# Patient Record
Sex: MALE | Race: WHITE
[De-identification: names, ages, dates, MRNs, and addresses within clinical notes are randomized per-mention and may not be internally consistent; named-entity substitution may affect disease eponyms.]

---

## 2019-04-18 ENCOUNTER — HOSPITAL ENCOUNTER (OUTPATIENT)
Dept: HOSPITAL 84 - D.MRI | Age: 69
Discharge: HOME | End: 2019-04-18
Attending: FAMILY MEDICINE
Payer: MEDICARE

## 2019-04-18 DIAGNOSIS — M23.8X1: Primary | ICD-10-CM

## 2020-04-30 ENCOUNTER — HOSPITAL ENCOUNTER (OUTPATIENT)
Dept: HOSPITAL 84 - D.RAD | Age: 70
Discharge: HOME | End: 2020-04-30
Attending: ORTHOPAEDIC SURGERY
Payer: MEDICARE

## 2020-04-30 DIAGNOSIS — S83.231A: Primary | ICD-10-CM

## 2020-06-05 ENCOUNTER — HOSPITAL ENCOUNTER (OUTPATIENT)
Dept: HOSPITAL 84 - D.HCCECHO | Age: 70
Discharge: HOME | End: 2020-06-05
Attending: INTERNAL MEDICINE
Payer: MEDICARE

## 2020-06-05 DIAGNOSIS — M79.605: ICD-10-CM

## 2020-06-05 DIAGNOSIS — M79.604: ICD-10-CM

## 2020-06-05 DIAGNOSIS — M79.89: ICD-10-CM

## 2020-06-05 DIAGNOSIS — I25.10: Primary | ICD-10-CM

## 2020-07-14 ENCOUNTER — HOSPITAL ENCOUNTER (OUTPATIENT)
Dept: HOSPITAL 84 - D.CATH | Age: 70
Discharge: HOME | End: 2020-07-14
Attending: INTERNAL MEDICINE
Payer: MEDICARE

## 2020-07-14 VITALS — DIASTOLIC BLOOD PRESSURE: 99 MMHG | SYSTOLIC BLOOD PRESSURE: 162 MMHG

## 2020-07-14 VITALS
WEIGHT: 270.77 LBS | WEIGHT: 270.77 LBS | BODY MASS INDEX: 41.04 KG/M2 | HEIGHT: 68 IN | HEIGHT: 68 IN | BODY MASS INDEX: 41.04 KG/M2

## 2020-07-14 DIAGNOSIS — I25.10: ICD-10-CM

## 2020-07-14 DIAGNOSIS — J45.909: ICD-10-CM

## 2020-07-14 DIAGNOSIS — Z45.02: Primary | ICD-10-CM

## 2020-07-14 DIAGNOSIS — E78.5: ICD-10-CM

## 2020-07-14 DIAGNOSIS — I42.9: ICD-10-CM

## 2020-07-14 DIAGNOSIS — I10: ICD-10-CM

## 2020-07-14 DIAGNOSIS — I25.2: ICD-10-CM

## 2020-07-14 LAB
ANION GAP SERPL CALC-SCNC: 10.1 MMOL/L (ref 8–16)
APTT BLD: 28.7 SECONDS (ref 22.8–39.4)
BUN SERPL-MCNC: 14 MG/DL (ref 7–18)
CALCIUM SERPL-MCNC: 9 MG/DL (ref 8.5–10.1)
CHLORIDE SERPL-SCNC: 105 MMOL/L (ref 98–107)
CO2 SERPL-SCNC: 28.2 MMOL/L (ref 21–32)
CREAT SERPL-MCNC: 1.1 MG/DL (ref 0.6–1.3)
ERYTHROCYTE [DISTWIDTH] IN BLOOD BY AUTOMATED COUNT: 14.6 % (ref 11.5–14.5)
GLUCOSE SERPL-MCNC: 91 MG/DL (ref 74–106)
HCT VFR BLD CALC: 44.6 % (ref 42–54)
HGB BLD-MCNC: 14.4 G/DL (ref 13.5–17.5)
INR PPP: 0.94 (ref 0.85–1.17)
MCH RBC QN AUTO: 29.1 PG (ref 26–34)
MCHC RBC AUTO-ENTMCNC: 32.3 G/DL (ref 31–37)
MCV RBC: 90.1 FL (ref 80–100)
OSMOLALITY SERPL CALC.SUM OF ELEC: 278 MOSM/KG (ref 275–300)
PLATELET # BLD: 180 10X3/UL (ref 130–400)
PMV BLD AUTO: 10.6 FL (ref 7.4–10.4)
POTASSIUM SERPL-SCNC: 4.3 MMOL/L (ref 3.5–5.1)
PROTHROMBIN TIME: 12.5 SECONDS (ref 11.6–15)
RBC # BLD AUTO: 4.95 10X6/UL (ref 4.2–6.1)
SODIUM SERPL-SCNC: 139 MMOL/L (ref 136–145)
WBC # BLD AUTO: 7.5 10X3/UL (ref 4.8–10.8)

## 2020-07-14 NOTE — HEMODYNAMI
PATIENT:BETTY CHILDRESS                                   MEDICAL RECORD: [id number]
: 52                                            LOCATION:D.CAT          
ACCT# [id number]                                       ADMISSION DATE: 20
 
 
 Generatedon:202015:12
Patient name: BETTY CHILDRESS Patient #: [id number] Visit #: [id number] SSN: [id number]
[id number] :
1950 Date of study: 2020
Page: Of
Hemodynamic Procedure Report
****************************
Patient Data
Patient Demographics
Procedure consent was obtained
First Name: BETTY          Gender: Male
Last Name: FIOR            : 1950
Middle Initial: G           Age: 70 year(s)
Patient #: [id number]       Race: 
Visit #: [id number]
SSN: [id number]
Accession #:
[id number]
Additional ID: [id number]
Contact details
Address: [address] [place] [address]      Phone: [phone]
[facility]
State: [place]
City: [place]
Zip code: [address]
Past Medical History
Allergies: No known allergies
Admission
Admission Data
Admission Date: 2020   Admission Time: 11:59
Arrival Date: 2020     Arrival Time: 0:00
Admit Source: Other         Insurance Payor: Medicare
[facility] #: [id number]
Height (in.): 68            BSA: 2.32 (m2)
Height (cm.): 172.72        BMI: 41.17 (kg/m2)
Weight (lbs.): 270.75
Weight (kg.): 122.81
Lab Results
Lab Result Date: 2020  Lab Result Time: 0:00
Biochemistry
Name         Units    Result                Min      Max
BUN          mg/dl    14       --(--*-)--   7        18
Creatinine   mg/dl    1.1      --(--*-)--   0.6      1.3
CBC
Name         Units    Result                Min      Max
Hematocrit   %        44.6     --(*---)--   42       54
Hemoglobin   g/dl     14.4     --(*---)--   13.5     17.5
Procedure
Procedure Types
Cath Procedure
Diagnostic Procedure
PPM/ICD
Permanent Pacer Generator Exg.
Sedation Charges
 
Moderate Sedation up to 15 minutes
Procedure Description
Procedure Date
Procedure Date: 2020
Procedure Start Time: 14:47
Procedure End Time: 15:06
Procedure Staff
Name                            Function
Michele Boateng MD              Performing Physician
Jeronimo Ott MD             Assisting physician
Marla Keller RT                Monitor
Marylou Looney RT                    Scrub
Parish Trent RN                  Nurse
Procedure Data
Cath Procedure
Fluoroscopy
Diagnostic fluoroscopy      Total fluoroscopy Time: 0
time: 0 min                 min
Diagnostic fluoroscopy      Total fluoroscopy dose: 0
dose: 0 mGy                 mGy
Estimated blood loss: 10 ml
Procedure Complications
No complications
Procedure Medications
Medication           Administration Route Dosage
Oxygen               etCO2 Nasal cannula  2 l/min
Lidocaine 2%         added to field       20
Ancef (1Gm/50ml NS)  I.V.P.B              1 g
Ancef Irrigation     Topical              1 g
(1gm/500ml NS)
Versed               I.V.                 2 mg
Fentanyl             I.V.                 100 mcg
Versed               I.V.                 1 mg
Fentanyl             I.V.                 50 mcg
Versed               I.V.                 1 mg
Fentanyl             I.V.                 50 mcg
Hemodynamics
Rest
BSA: 2.32 (m2) HGB: 14.4 (g/dl) O2 Consumption: Estimated: 276.67 (ml/min) O2 Co
nsumption
indexed: Estimated:119.25 (ml/min/m) Heart Rate: 79 (bpm)
Snapshots
Pre Cath      Intra         NCS           Post Cath
Vital Signs
Time     Heart  Resp   SPO2 etCO2   NIBP (mmHg)  Rhythm  Pain      Sedation
Rate   (ipm)  (%)  (mmHg)                       Status    Level
(bpm)
14:31:43 81     13     93   0       145/102(127) NSR     (Missing) 10(A)
14:36:49 72     12     95   0       151/98(135)  NSR     (Missing) 10(A)
14:41:07 78     23     94   0       135/91(119)  NSR     (Missing) 10(A)
14:45:17 86     15     92   0       132/113(130) NSR     (Missing) 9(A)
14:50:36 96     14     92   2.9     146/105(135) NSR     (Missing) 9(A)
14:54:46 85     15     94   19.4    144/94(122)  NSR     (Missing) 9(A)
14:59:00 86     12     93   2.2     148/95(135)  NSR     (Missing) 9(A)
15:03:18 93     11     93   10.4    150/109(132) NSR     (Missing) 10(A)
15:05:03 89     10     94   11.2    150/103(135) NSR     (Missing) 10(A)
15:09:31 93     12     97   26.1    155/106(131) NSR     (Missing) 10(A)
Medications
Time     Medication  Route   Dose  Verified Delivered Reason    Notes  Effective
ness
 
by       by
14:30:52 Oxygen      etCO2   2     Michele  Buffie    used for
Nasal   l/min KilohPilip Trent RN   procedure
cannula       MD
14:31:06 Lidocaine   added   20ml  Michele Elmoreian used for
2%          to      vial  [place] Philip Ott MD procedure
field   x 2   MD
14:31:20 Ancef       I.V.P.B 1 g   Michele  Buffie    used for
(1Gm/50ml                 Kilo  Trent RN   procedure
NS)                       MD
14:31:28 Ancef       Topical 1 g   Michele  Buffie    used for
Irrigation                Kilo  Trent RN   procedure
(1gm/500ml                MD
NS)
14:41:59 Versed      I.V.    2 mg  Michele  Buffie    for
Iklo  Bell RN   sedation
MD
14:42:06 Fentanyl    I.V.    100   Michele  Buff[place]    for
[facility]   Kilo  Trent RN   sedation
MD
14:49:08 Versed      I.V.    1 mg  Michele  [place]    for
Kilo  Bell RN   sedation
MD
14:49:13 Fentanyl    I.V.    50    Michele  [place]    for
[facility]   Kilo  Trent RN   sedation
MD
14:56:38 Versed      I.V.    1 mg  Michele  [place]    for
Kilo  Bell RN   sedation
MD
14:56:42 Fentanyl    I.V.    50    Michele  [place]    for
[facility]   Kilo  Trent RN   sedation
MD
Procedure Log
Time     Note
13:58:16 Diagnostic Cath Status : Elective
13:58:58 Time tracking: Regular hours (M-F 7:00 - 5:00)
13:59:02 Plan of Care:Hemodynamics will remain stable., Cardiac rhythm will
remain stable., Comfort level will be maintained., Respiratory function
will remain adequate., Patient/ family verbilizes understanding of
procedure., Procedure tolerated without complication., Recovers from
procedure without complications..
14:06:04 H&P Date Dictated: 2020 Within 30 days and on chart..
14:06:05 Pre-procedure instructions explained to patient.
14:06:06 Pre-op teaching completed and patient verbalized understanding.
14:06:07 Family in waiting room.
14:06:09 Patient NPO since Midnight.
14:08:14 Patient allergic to No known allergies
14:: Arrival Date: 2020 12:00:00 AM
14:: Admit Source: Other
14:: Insurance Payor : Medicare
14:09:03 Patient Height : 68 inches
14::08 Patient Weight : 270.75 lbs
14:37 Lab Result : Hemoglobin 14.4 g/dl
: Lab Result : Hematocrit 44.6 %
: Lab Result : Creatinine 1.1 mg/dl
: Lab Result : BUN 14 mg/dl
14: Informed consent obtained and on chart
14:10: Parish Trent RN sent for patient. Start room use.
14:19: Patient received from Pre/Post Procedure Room to CCL 3 Alert and
oriented. Tansferred to table in Supine position.
 
14:19:10 Warm blankets applied, and lizette hugger turned on for patient comfort.
14:19:10 Correct patient and procedure confirmed by team.
14:19:11 ECG and BP/O2 sat monitors applied to patient.
14:30:34 Vital chart was started
14::52 Oxygen 2 l/min etCO2 Nasal cannula was administered by Parish Trent RN;
used for procedure; Verbal order read back and verified.
14:31:06 Lidocaine 2% 20ml vial x 2 added to field was administered by Jeronimo Ott MD; used for procedure; Verbal order read back and verified.
14:31:20 Ancef (1Gm/50ml NS) 1 g I.V.P.B was administered by Parish Trent RN; use
d
for procedure; Verbal order read back and verified.
14:31:28 Ancef Irrigation (1gm/500ml NS) 1 g Topical was administered by Parish Trent RN; used for procedure; Verbal order read back and verified.
14:32:34 Is the patient allergic to Iodine/contrast media? No.
14:32:37 Was the patient premedicated? N/A
14:32:39 Is patient on blood thinner?No
14:32:40 Patient diabetic? No.
14:32:42 If diabetic: On Metformin? N/A
14:32:43 -----------------------------------------------------------------------
-
14:32:44 ----Pre-sedation anethsthesia assessment.----
14:32:47 Previous problem with sedation/anesthesia? No ?
14:32:48 Snore? Yes
14:32:49 Sleep apnea? No
14:32:50 Deviated septum? No
14:32:51 Opens mouth fully? Yes
14:32:52 Sticks out tongue? Yes
14:32:56 Airway obstruction? Yes asthma
14:32:59 Dentures? No ?
14:33:05 Patient pain scale 0/10 ?.
14:33:13 IV patent on arrival in left antecubital with 0.9% NaCl at [facility].
14:33:16 Lab results completed and on chart.
14:33:21 Stress Test: no; N/A ?
14:33:27 Left chest area was prepped with chlora-prep and draped in sterile
fashion
14:33:28 Alarms reviewed by R. N.
14:33:28 Sharps counted by scrub and verified by R.N.
14:33:33 Use device set SHAR PPM
14:33:37 Cautery Tip  opened to sterile field.
14:33:39 Cautery Pushbutton Pencil opened to sterile field.
14:33:39 Mepilex Dressing (013465) opened to sterile field.
14:33:49 Full Disclosure recording started
14:33:52 Baseline sample Acquired.
14:35:03 Rhythm: sinus rhythm
14:40:43 3-0 Vicryl Single Pack [id number] opened to sterile field.
14:40:44 5-0 Monocryl PS2 Y495G opened to sterile field.
14:41:13 --------ALL STOP TIME OUT------
14:41:13 Final Timeout: patient, procedure, and site verified with staff and
physician. All members of the team are in agreement.
14:41:16 Left chest site verified by team.
14:41:25 Fire Safety Assessment: A--An alcohol-based skin anteseptic being used
preoperatively., C--Open oxygen or nitrous oxide is being used., D--An
ESU, laser, or fiber-optic light is being used.
14:41:31 Physical assessment completed. ASA score P 2 - A patient with mild
systemic disease as per Michele Boateng MD.
14:41:39 Sedation plan: IV Moderate Sedation Medication:Versed, Fentanyl
14:41:59 Versed 2 mg I.V. was administered by Parish Trent RN; for sedation;
Verbal order read back and verified.
14:42:06 Fentanyl 100 mcg I.V. was administered by Parish Trent RN; for sedation;
Verbal order read back and verified.
 
14:44:17 Procedure started.
14:47:06 Med[employer] representative DAVID MURPHY present for procedure.
14:47:16 Grounding pad site Left thigh.
14:47:17 Grounding pad site free from injury.
14:47:18 Lidocaine 1% was administered to left subclavicular area by Jeronimo Ott MD .
14:47:50 Pre sharps counted by scrub and verified by RN: Sutures: 7; Sponges: 5;
Stick needles: 2; Skin needles: 2; Blade: 1; Cautery: 1
14:49:07 2-0 Ticron Multipack ([id number]) opened to sterile field.
14:49:08 Versed 1 mg I.V. was administered by Parish Trent RN; for sedation;
Verbal order read back and verified.
14:49:13 Fentanyl 50 mcg I.V. was administered by Parish Trent RN; for sedation;
Verbal order read back and verified.
14:49:43 Incision made to left subclavicular area.
14:52:27 Generator pocket made/opened.
14:52:49 PPM Dual was removed..
14:53:00 SETVIa MRI PPM Dual Generator [id number] opened to sterile field.
14:54:00 PPM Dual was attached to lead(s) and inserted into pocket.
14:55:56 PPM Dual was inserted subcutaneously to left chest.
14:56:38 Versed 1 mg I.V. was administered by Parish Trent RN; for sedation;
Verbal order read back and verified.
14:56:42 Fentanyl 50 mcg I.V. was administered by Parish Trent RN; for sedation;
Verbal order read back and verified.
14:57:02 Generator was sutured in place with 2-0 ticron.
15:01:23 Device pocket was irrigated with Ancef.
15:01:25 Subcutaneous closure was completed with 3-0 vicryl.
15:01:35 Skin closure was completed with 5-0 monocryl.
15:03:10 Lt Chest incision was dressed with Mepilex dressing.
15:03:32 Post sharps counted by scrub and verified by RN: Sutures: 3; Sponges: 5
;
Stick needles: 2; Skin needles: 2; Blade: 1; Cautery: 1
15:04:02 Procedure ended.(Physican Out)
15:04:19 Fluoroscopy time 00.00 minutes.
15:04:22 Fluoroscopy dose: 0 mGy
15:04: Flurop Dose total: 0
15:04:24 Dose Area Product ? mGy/cm.
15:04:27 Sharps counted by scrub and verified by R.N.
15:04:35 Post-procedure physical assessment completed. ASA score P 2 - A patient
with mild systemic disease as per Michele Boateng MD.
15:04:41 Post procedure rhythm: paced
15:04:44 Estimated blood loss: 10 ml
15:04:46 Post procedure instruction explained to patient.Patient verbalizes
understanding.
15:04:46 Patient needs reinforcement of post procedure teaching.
15:05:20 Procedure type changed to Cath procedure, Diagnostic procedure, PPM/ICD
,
Permanent Pacer Generator Exg., Sedation Charges, Moderate Sedation up
to 15 minutes
15:06:17 Procedure and supply charges have been captured, reviewed, submitted an
d
are correct.
15:06:22 Procedure Complication : No complications
15:06:29 Operative report dictated upon procedure completion.
15:06:29 See physician's report for complete and final results.
15:06:35 Report given to Pre/Post Procedure Room.
15:06:39 Patient transfered to Pre/Post Procedure Room with Stretcher.
15:06:42 Procedure ended.
15:06:42 Full Disclosure recording stopped
15:10:17 Vital chart was stopped
15:10:18 End room use (Document Last)
 
15:10:53 Medtronic 4074-52 PPM Lead opened to sterile field.
Device Usage
Item Name    Manufacture  Quantity  Catalog     Hospital Part    Current Minimal
 Lot# /
Number      Charge   Number  Stock   Stock   Serial#
Code
Cautery Tip  Microtek     1         [id number]    249380   827580  076871  5
      Medical Inc.
Cautery      Microtek     1         [id number]      503809   09190   444700  5
Pushbutton   [employer] Inc.
Pencil
Mepilex      Cardinal     1         579336      826282   072436  622236  5
Dressing     Health
(309293)
3-0 Vicryl   Ethicon      1         [id number]     010650   122383  311932  5
Single Pack
[id number]
5-0 Monocryl Ethicon      1         Y495G       065950   126041  972790  5
PS2 Y495G
2-0 Ticron   Ethicon      1         [id number]  247489   97178   049530  5
Multipack
([id number])
Medtronic    Medtronic    1         A2DR01      855903   906744  679273  5
Advisa MRI                                                                      
 [id number]
PPM Dual                                                                        
 EXP
Generator                                                                       
 :
A2DR01                                                                          
 2021
Medtronic    Medtronic    1         4074-52     819622   106553  437676  5
4074-52 PPM
Lead
Signature Audit [place]
Stage           Time        Signature      Unsigned
Intra-Procedure 2020   Marla Keller
3:10:53 PM  RT(R)
Intra-Procedure 2020   Parish Trent RN
3:12:08 PM
Intra-Procedure 2020   Michele Felipe
3:12:36 PM  Philip WELCH
 
 
 
 
 
 
 
 
 
 
 
 
 
[facility]                                          
[address] [place] [address]

## 2020-07-14 NOTE — NUR
PT RECEIVED BACK TO ROOM VIA STRETCHER FROM CATH LAB.  XRAY AT BS.
PT AWAKE BUT DROWSY, PT PLACED ON CARDIAC MONITORS AND O2 VIA NC AT
2L.  SM MEDIPLEX DRESSING TO U LEFT CHEST, NO BLEEDING OR SWELLING
NOTED.  PT DENIES PAIN OR DISCOMFORT.  VSS.  HR PACED 78, /92,
RR 10.  NO IV, IT WAS PULLED OUT IN THE CATH LAB.  CALL LIGHT IN
REACH, WIFE AT BS.

## 2020-07-14 NOTE — NUR
1620 SANDWICH TRAY AND DRINK SERVED.
PT RESTING COMFORTABLY.  TOLERATED SANDWICH TRAY AND DRINK.  MEDIPLEX
DRESSING CDI NO S/S BLEEDING. VSS.

## 2020-07-14 NOTE — NUR
DISCHARGE INSTRUCTIONS REVIEWED W PT AND WIFE, BOTH VERBALIZED
UNDERSTANDING.  MONITORS REMOVED AND PT UP TO DRESS FOR DISCHARGE
WITH ASSIST FROM WIFE.

## 2020-07-14 NOTE — NUR
PT TO BR VIA WC, VOIDING W/O DIFFICULITY. PT THEN DISCHARGED VIA WC
TO WIFE WAITING IN PRIVATE VEHICLE.  PT HAD ALL BELONGINGS AND
DISCHARGE PAPERWORK

## 2020-07-14 NOTE — OP
PATIENT NAME:  BETTY CHILDRESS                            MEDICAL RECORD: [id number]
:52                                             LOCATION:D.CAT          
                                                         ADMISSION DATE:        
SURGEON:  JERONIMO MYLES MD          
 
 
DATE OF OPERATION:  2020
 
PREOPERATIVE DIAGNOSES:
1.  End-of-life AICD generator.
2.  Coronary artery disease.
3.  Hypertension.
4.  Cardiomyopathy.
5.  Hyperlipidemia.
 
POSTOPERATIVE DIAGNOSES:
1.  End-of-life AICD generator.
2.  Coronary artery disease.
3.  Hypertension.
4.  Cardiomyopathy.
5.  Hyperlipidemia.
 
PROCEDURE:  Left subclavian vein 4 lead AICD generator exchange.
 
SURGEON:  Jeronimo Myles MD
 
REPORT OF PROCEDURE:  The patient's left chest was prepped and draped in sterile
fashion.  A 20 mL of 1% lidocaine with epinephrine was infused into the
surrounding tissues.  The oblique incision in the left superior lateral chest
was reopened and using electrocautery, we dissected down to the AICD generator. 
This generator was eviscerated through the wound and the leads were freed up
from any attachments.  We then took down all 4 leads off the generator and
replaced them appropriately in the new AICD generator.  This was then placed
back into the subcutaneous pouch and sutured down to the pectoral fascia with an
interrupted 2-0 TiCron.  The wound bed was irrigated out with normal saline with
antibiotic solution.  The subcutaneous tissues were reapproximated with
interrupted 3-0 Vicryl and the skin was closed with running subcutaneous 5-0
Monocryl.
 
COMPLICATIONS:  None.
 
CONDITION:  Stable.
 
ANESTHESIA:  Local MAC.
 
BLOOD LOSS:  Minimal.
 
TRANSINT:[id number] Voice Confirmation ID: [id number] DOCUMENT ID: [id number]
                                           
                                           JERONIMO MYLES MD          
CC: SHANON ZELAYA MD and RUPA MURILLO MD               [id number]
DICTATION DATE: 20 1510     :     20      DEP CLI 
                                                                      20
[address] [place] [address]

## 2020-09-01 ENCOUNTER — HOSPITAL ENCOUNTER (OUTPATIENT)
Dept: HOSPITAL 84 - D.LABREF | Age: 70
Discharge: HOME | End: 2020-09-01
Attending: ORTHOPAEDIC SURGERY
Payer: MEDICARE

## 2020-09-01 VITALS — BODY MASS INDEX: 41.1 KG/M2

## 2020-09-01 DIAGNOSIS — M17.11: Primary | ICD-10-CM

## 2020-09-03 ENCOUNTER — HOSPITAL ENCOUNTER (INPATIENT)
Dept: HOSPITAL 84 - D.SDCHOLD | Age: 70
LOS: 33 days | Discharge: NURSING, REHAB OR LONG-TERM CARE | DRG: 469 | End: 2020-10-06
Attending: ORTHOPAEDIC SURGERY | Admitting: ORTHOPAEDIC SURGERY
Payer: MEDICARE

## 2020-09-03 VITALS
HEIGHT: 71 IN | WEIGHT: 276 LBS | BODY MASS INDEX: 38.64 KG/M2 | BODY MASS INDEX: 38.64 KG/M2 | BODY MASS INDEX: 38.64 KG/M2

## 2020-09-03 DIAGNOSIS — F03.90: ICD-10-CM

## 2020-09-03 DIAGNOSIS — I50.21: ICD-10-CM

## 2020-09-03 DIAGNOSIS — Z87.891: ICD-10-CM

## 2020-09-03 DIAGNOSIS — D62: ICD-10-CM

## 2020-09-03 DIAGNOSIS — Z95.0: ICD-10-CM

## 2020-09-03 DIAGNOSIS — M17.11: Primary | ICD-10-CM

## 2020-09-03 DIAGNOSIS — I25.10: ICD-10-CM

## 2020-09-03 DIAGNOSIS — J45.909: ICD-10-CM

## 2020-09-23 LAB
ANION GAP SERPL CALC-SCNC: 8.9 MMOL/L (ref 8–16)
APTT BLD: 27.2 SECONDS (ref 22.8–39.4)
BASOPHILS NFR BLD AUTO: 1 % (ref 0–2)
BILIRUB SERPL-MCNC: NEGATIVE MG/DL
BUN SERPL-MCNC: 17 MG/DL (ref 7–18)
CALCIUM SERPL-MCNC: 9.2 MG/DL (ref 8.5–10.1)
CHLORIDE SERPL-SCNC: 102 MMOL/L (ref 98–107)
CO2 SERPL-SCNC: 28.6 MMOL/L (ref 21–32)
CREAT SERPL-MCNC: 1.2 MG/DL (ref 0.6–1.3)
EOSINOPHIL NFR BLD: 5.2 % (ref 0–7)
ERYTHROCYTE [DISTWIDTH] IN BLOOD BY AUTOMATED COUNT: 14.2 % (ref 11.5–14.5)
GLUCOSE SERPL-MCNC: 89 MG/DL (ref 74–106)
HCT VFR BLD CALC: 45.5 % (ref 42–54)
HGB BLD-MCNC: 14.8 G/DL (ref 13.5–17.5)
IMM GRANULOCYTES NFR BLD: 0.6 % (ref 0–5)
INR PPP: 0.93 (ref 0.85–1.17)
KETONES UR STRIP-MCNC: NEGATIVE MG/DL
LYMPHOCYTES NFR BLD AUTO: 25 % (ref 15–50)
MCH RBC QN AUTO: 29.2 PG (ref 26–34)
MCHC RBC AUTO-ENTMCNC: 32.5 G/DL (ref 31–37)
MCV RBC: 89.9 FL (ref 80–100)
MONOCYTES NFR BLD: 7.8 % (ref 2–11)
NEUTROPHILS NFR BLD AUTO: 60.4 % (ref 40–80)
NITRITE UR-MCNC: NEGATIVE MG/ML
OSMOLALITY SERPL CALC.SUM OF ELEC: 270 MOSM/KG (ref 275–300)
PH UR STRIP: 5 [PH] (ref 5–8)
PLATELET # BLD: 183 10X3/UL (ref 130–400)
PMV BLD AUTO: 10.7 FL (ref 7.4–10.4)
POTASSIUM SERPL-SCNC: 4.5 MMOL/L (ref 3.5–5.1)
PROTHROMBIN TIME: 12.5 SECONDS (ref 11.6–15)
RBC # BLD AUTO: 5.06 10X6/UL (ref 4.2–6.1)
SODIUM SERPL-SCNC: 135 MMOL/L (ref 136–145)
UROBILINOGEN UR-MCNC: NORMAL MG/DL (ref ?–2)
WBC # BLD AUTO: 6.9 10X3/UL (ref 4.8–10.8)

## 2020-09-28 VITALS — DIASTOLIC BLOOD PRESSURE: 70 MMHG | SYSTOLIC BLOOD PRESSURE: 114 MMHG

## 2020-09-28 VITALS — SYSTOLIC BLOOD PRESSURE: 148 MMHG | DIASTOLIC BLOOD PRESSURE: 90 MMHG

## 2020-09-28 VITALS — SYSTOLIC BLOOD PRESSURE: 115 MMHG | DIASTOLIC BLOOD PRESSURE: 71 MMHG

## 2020-09-28 VITALS — SYSTOLIC BLOOD PRESSURE: 128 MMHG | DIASTOLIC BLOOD PRESSURE: 74 MMHG

## 2020-09-28 VITALS — DIASTOLIC BLOOD PRESSURE: 61 MMHG | SYSTOLIC BLOOD PRESSURE: 123 MMHG

## 2020-09-28 VITALS — DIASTOLIC BLOOD PRESSURE: 71 MMHG | SYSTOLIC BLOOD PRESSURE: 115 MMHG

## 2020-09-28 VITALS — DIASTOLIC BLOOD PRESSURE: 76 MMHG | SYSTOLIC BLOOD PRESSURE: 141 MMHG

## 2020-09-28 VITALS — DIASTOLIC BLOOD PRESSURE: 77 MMHG | SYSTOLIC BLOOD PRESSURE: 113 MMHG

## 2020-09-28 VITALS — DIASTOLIC BLOOD PRESSURE: 66 MMHG | SYSTOLIC BLOOD PRESSURE: 97 MMHG

## 2020-09-28 VITALS — SYSTOLIC BLOOD PRESSURE: 102 MMHG | DIASTOLIC BLOOD PRESSURE: 61 MMHG

## 2020-09-28 VITALS — SYSTOLIC BLOOD PRESSURE: 105 MMHG | DIASTOLIC BLOOD PRESSURE: 71 MMHG

## 2020-09-28 VITALS — SYSTOLIC BLOOD PRESSURE: 125 MMHG | DIASTOLIC BLOOD PRESSURE: 86 MMHG

## 2020-09-28 VITALS — SYSTOLIC BLOOD PRESSURE: 114 MMHG | DIASTOLIC BLOOD PRESSURE: 78 MMHG

## 2020-09-28 LAB
ALBUMIN SERPL-MCNC: 3.4 G/DL (ref 3.4–5)
ALP SERPL-CCNC: 57 U/L (ref 30–120)
ALT SERPL-CCNC: 19 U/L (ref 10–68)
ANION GAP SERPL CALC-SCNC: 6.5 MMOL/L (ref 8–16)
BASOPHILS NFR BLD AUTO: 0.3 % (ref 0–2)
BILIRUB SERPL-MCNC: 0.17 MG/DL (ref 0.2–1.3)
BUN SERPL-MCNC: 16 MG/DL (ref 7–18)
CALCIUM SERPL-MCNC: 8.2 MG/DL (ref 8.5–10.1)
CHLORIDE SERPL-SCNC: 103 MMOL/L (ref 98–107)
CO2 SERPL-SCNC: 30.7 MMOL/L (ref 21–32)
CREAT SERPL-MCNC: 1.2 MG/DL (ref 0.6–1.3)
EOSINOPHIL NFR BLD: 0.1 % (ref 0–7)
ERYTHROCYTE [DISTWIDTH] IN BLOOD BY AUTOMATED COUNT: 14.2 % (ref 11.5–14.5)
GLOBULIN SER-MCNC: 3.4 G/L
GLUCOSE SERPL-MCNC: 153 MG/DL (ref 74–106)
HCT VFR BLD CALC: 41.7 % (ref 42–54)
HGB BLD-MCNC: 13.5 G/DL (ref 13.5–17.5)
IMM GRANULOCYTES NFR BLD: 0.4 % (ref 0–5)
LYMPHOCYTES NFR BLD AUTO: 7.2 % (ref 15–50)
MCH RBC QN AUTO: 29.5 PG (ref 26–34)
MCHC RBC AUTO-ENTMCNC: 32.4 G/DL (ref 31–37)
MCV RBC: 91 FL (ref 80–100)
MONOCYTES NFR BLD: 2.8 % (ref 2–11)
NEUTROPHILS NFR BLD AUTO: 89.2 % (ref 40–80)
OSMOLALITY SERPL CALC.SUM OF ELEC: 275 MOSM/KG (ref 275–300)
PLATELET # BLD: 168 10X3/UL (ref 130–400)
PMV BLD AUTO: 10.8 FL (ref 7.4–10.4)
POTASSIUM SERPL-SCNC: 4.2 MMOL/L (ref 3.5–5.1)
PROT SERPL-MCNC: 6.8 G/DL (ref 6.4–8.2)
RBC # BLD AUTO: 4.58 10X6/UL (ref 4.2–6.1)
SODIUM SERPL-SCNC: 136 MMOL/L (ref 136–145)
WBC # BLD AUTO: 10.8 10X3/UL (ref 4.8–10.8)

## 2020-09-28 PROCEDURE — 0SRC0JZ REPLACEMENT OF RIGHT KNEE JOINT WITH SYNTHETIC SUBSTITUTE, OPEN APPROACH: ICD-10-PCS | Performed by: ORTHOPAEDIC SURGERY

## 2020-09-28 NOTE — NUR
BEDSIDE REPORT RECEIVED WITH HODAN OGDEN IN ROOM. PATIENT CALM, ALERT, AND
ORIENTED TO SELF AND SURROUNDINGS. WIFE AT BEDSIDE. PATIENT RESPONDS TO
QUESTIONS APPROPRIATELY WITH SOME GARBLED SPEECH, BUT EASILY COMPREHENDABLE.
PATIENT HAS IVF FLUIDS INFUSING PER ORDER TO PATENT 20 G IV TO THE LEFT HAND.
PATIENT CURRENTLY ON CPM PER ORDER. PERIPHERAL PULSES ASSESSED, PALPABLE
DORSALIS PEDAL PULSE. EXTREMETY WARM, PATIENT WIGGLES TOES UPON COMMAND, WITH
LESS THAN THREE SECOND CAPILLARY REFILL. DENIES PAIN AND DISCOMFORT AT THIS
TIME. CPOC.

## 2020-09-28 NOTE — NUR
THROUGH TRAFFIC KEPT TO A MINIMUM. HIBACLENS AND ALCOHOL USED TO
CLEAN BEFORE PREPPING. STERILE GOWNED AND GLOVED TO PREP WITH
CHLORAPREP.

## 2020-09-28 NOTE — NUR
PT WIFE HAS ARRIVED. PUT BELONGINGS IN ROOM. MADE SURE I HAD THE RIGHT PHONE
NUMBER AND SHE HAS STEPPED OUTSIDE. PT SLEEPING. VS STABLE. CL IN REACH. WCTM

## 2020-09-28 NOTE — NUR
PT ARRIVED TO UNIT. VS STABLE. QUICK START, ADMISSION ASSESSMENT, ADMISSION
HISTORY AND SUICIDE SCREEN COMPLETED. MEDS GIVEN PER EMAR. WIFE NOT AT BEDSIDE
AT THIS TIME. PT SLEEPING AT THIS TIME. EASILY AWAKENED. CL IN REACH. WCTM.

## 2020-09-28 NOTE — NUR
IN AND OUT CATH PERFORMED USING STERILE TECHNIQUE. THIS NURSE CLAMPED IN AND
OUT CATH BAG EVERY 100 ML TO AVOID BLADDER SPASMS. RECEIVED 850 IN YELLOW
URINE. PATIENT EXPRESSED REFLEIF. EDUCATED PATIENT AND WIFE, HERMANN, THAT IN
AND OUT CATH SHOULD STIMULATE ELIMINATION PROCESS BUT COULD STILL TAKE A FEW
HOURS BEFORE URGE RETURNED BUT THIS NURSE WILL MONITOR. URINAL AT BEDSIDE.
ENCOURAGED TO PLACE INBETWEEN PATIENTS LEGS AND SIT HOB UP TO ENCOURAGE URGE.
INSTRUCTED BOTH WIFE AND PATIENT THAT THIS NURSE WILL BE AVAILABLE TO HELP
WITH ANY AND ALL NEEDS. EDUCATED ON USE OF CALL LIGHT. EXPLAINED PLAN OF CARE
FOR SHIFT. BOTH RECEPTIVE TO TEACHING. DENIES QUESTIONS AT THIS TIME. CALL
LIGHT CLOSE. CPOC.

## 2020-09-28 NOTE — NUR
ADMINISTERED HS MEDICATIONS. REMOVED PATIENT FROM CPM. ASSISTED WITH
REPOSITIONG FOR COMFORT.SCANT AMOUNT OF BLOOD DRAINAGE TO ACE WRAP. PATIENT
COMPLAINS OF PAIN, 5/10 TO THE RIGHT KNEE. REQUESTS PAIN MEDICINE.
ADMINSITERED PRN MEDICATION PER ORDER. WIFE AT BEDSIDE. EDUCATED WIFE AND
PATIENT ON TURN, COUGH, DEEP BREATHING. PATIENT COUGHED AND TOOK DEEP BREATHS
FOR THIS NURSE. USED INCENTIVE SPIROMETER 5 TIMES WHILE NURSE WAS IN ROOM.
DENIES OTHER NEEDS AT THIS TIME. SCDS ON, BILATERALLY. BED ALARM ON. CALL
LIGHT CLOSE. CPOC.

## 2020-09-28 NOTE — NUR
BLADDER SCAN PERFORMED ON PATIENT WITH HODAN OGDEN. PATIENT HAS NOT VOIDED
SINCE 1000. RECEIVED RESULTS -810 IN BLADDER. EXPLAINED TO PATIENT AND
WIFE AT BEDSIDE THAT IN AND OUT CATH WOULD NEED TO BE PERFORMED TO STIMULATE
URINATION/PROSTATE. VERBALIZED UNDERSTANDING.

## 2020-09-28 NOTE — NUR
WIFE IN ROOM. NO CO OF PAIN AT THIS TIME. PT LAID BACK IN BED FOR COMFORT. CL
IN REACH. BED ALARM ON. WCTM

## 2020-09-28 NOTE — MORECARE
CASE MANAGEMENT DISCHARGE SUMMARY
 
 
PATIENT: BETTY LIRA                      UNIT: [id number]
ACCOUNT#: [id number]                       ADM DATE: 20
AGE: 70     : [birth year]  SEX: M            ROOM/BED: D.1211    
AUTHOR: LAYLA,DOC                             PHYSICIAN:                               
 
REFERRING PHYSICIAN: CHANCE TERRELL MD        
DATE OF SERVICE: 20
Discharge Plan
 
 
Patient Name: BETTY LIRA
Facility: [facility]:[place]
Encounter #: [id number]
Medical Record #: [id number]
: 1950
Planned Disposition: 
Anticipated Discharge Date: 10/1/20
 
Discharge Date: 
Expected LOS: 3
Initial Reviewer: [id number]
Initial Review Date: 2020
Generated: 20   3:00 pm 
Comments
 
DCP- Discharge Planning
 
Updated by [id number]: Geno Ewing on 20  12:55 pm CT
CM met with patient and wife, Tamika Lira ([phone]) regarding dc plans. 
Patient is drowsy and request that his in interview. Patient is independent 
PTA, with ADL's, medication management. PCP: Dr. Cano. Pharmacy: [facility]. DME: walker, cane. A CPM was delivered to the home, as well PTA. 
CM discussed HHS, OP Therapy, Rehab, SNF. PT has not worked with the patient 
as yet, so CM will re-visit tomorrow in order to determine DC needs. CM left 
a copy of HH agencies and OP Therapy with the patient's wife. CM will follow 
and assist PRN with DC needs.
 DCPIA - Discharge Planning Initial Assessment
 
Updated by [id number]: Geno Ewing on 20   1:58 pm
*  Is the patient Alert and Oriented?
Yes
*  How many steps to enter\exit or inside your home? 3 wide w/r *  PCP Dr. Cano * 
Pharmacy
[facility], [address]
*  Preadmission Environment
Home with Family
*  ADLs
 
Independent
*  Equipment
Cane
Rolling Walker
*  Other Equipment
CPM
*  List name and contact numbers for known caregivers / representatives who 
currently or will assist patient after discharge:
Tamika Lira (wife) [phone]
*  Verbal permission to speak to the caregivers and representatives has been 
obtained from the patient.
Yes
*  Community resources currently utilized
None
*  Additional services required to return to the preadmission environment?
Yes
*  Can the patient safely return to the preadmission environment?
Yes
*  Has this patient been hospitalized within the prior 30 days at any 
hospital?
No
 
 
 
 
 
 
Patient Name: BETTY LIRA
Encounter #: [id number]
Page [phone]
 
 
 
 
 
Electronically Signed by VIVI RICH on 20 at 1401
 
 
 
 
 
 
**All edits/amendments must be made on the electronic document**
 
DICTATION DATE: 20     : POLA  20     
RPT#: [id number]                                DC DATE:        
                                               STATUS: ADM IN  
[facility]
 [place] [address]
***END OF REPORT***

## 2020-09-29 VITALS — SYSTOLIC BLOOD PRESSURE: 141 MMHG | DIASTOLIC BLOOD PRESSURE: 80 MMHG

## 2020-09-29 VITALS — DIASTOLIC BLOOD PRESSURE: 70 MMHG | SYSTOLIC BLOOD PRESSURE: 152 MMHG

## 2020-09-29 VITALS — DIASTOLIC BLOOD PRESSURE: 80 MMHG | SYSTOLIC BLOOD PRESSURE: 150 MMHG

## 2020-09-29 VITALS — SYSTOLIC BLOOD PRESSURE: 124 MMHG | DIASTOLIC BLOOD PRESSURE: 76 MMHG

## 2020-09-29 VITALS — SYSTOLIC BLOOD PRESSURE: 105 MMHG | DIASTOLIC BLOOD PRESSURE: 70 MMHG

## 2020-09-29 VITALS — DIASTOLIC BLOOD PRESSURE: 74 MMHG | SYSTOLIC BLOOD PRESSURE: 126 MMHG

## 2020-09-29 LAB
ALBUMIN SERPL-MCNC: 3.1 G/DL (ref 3.4–5)
ALP SERPL-CCNC: 54 U/L (ref 30–120)
ALT SERPL-CCNC: 16 U/L (ref 10–68)
ANION GAP SERPL CALC-SCNC: 7.5 MMOL/L (ref 8–16)
BASOPHILS NFR BLD AUTO: 0.2 % (ref 0–2)
BILIRUB SERPL-MCNC: 0.42 MG/DL (ref 0.2–1.3)
BUN SERPL-MCNC: 13 MG/DL (ref 7–18)
CALCIUM SERPL-MCNC: 7.9 MG/DL (ref 8.5–10.1)
CHLORIDE SERPL-SCNC: 102 MMOL/L (ref 98–107)
CO2 SERPL-SCNC: 29.6 MMOL/L (ref 21–32)
CREAT SERPL-MCNC: 1.1 MG/DL (ref 0.6–1.3)
EOSINOPHIL NFR BLD: 0.2 % (ref 0–7)
ERYTHROCYTE [DISTWIDTH] IN BLOOD BY AUTOMATED COUNT: 14 % (ref 11.5–14.5)
GLOBULIN SER-MCNC: 3.1 G/L
GLUCOSE SERPL-MCNC: 105 MG/DL (ref 74–106)
HCT VFR BLD CALC: 38.4 % (ref 42–54)
HGB BLD-MCNC: 12.1 G/DL (ref 13.5–17.5)
IMM GRANULOCYTES NFR BLD: 0.3 % (ref 0–5)
LYMPHOCYTES NFR BLD AUTO: 11.1 % (ref 15–50)
MCH RBC QN AUTO: 28.6 PG (ref 26–34)
MCHC RBC AUTO-ENTMCNC: 31.5 G/DL (ref 31–37)
MCV RBC: 90.8 FL (ref 80–100)
MONOCYTES NFR BLD: 10.4 % (ref 2–11)
NEUTROPHILS NFR BLD AUTO: 77.8 % (ref 40–80)
OSMOLALITY SERPL CALC.SUM OF ELEC: 269 MOSM/KG (ref 275–300)
PLATELET # BLD: 171 10X3/UL (ref 130–400)
PMV BLD AUTO: 11.3 FL (ref 7.4–10.4)
POTASSIUM SERPL-SCNC: 4.1 MMOL/L (ref 3.5–5.1)
PROT SERPL-MCNC: 6.2 G/DL (ref 6.4–8.2)
RBC # BLD AUTO: 4.23 10X6/UL (ref 4.2–6.1)
SODIUM SERPL-SCNC: 135 MMOL/L (ref 136–145)
WBC # BLD AUTO: 8.7 10X3/UL (ref 4.8–10.8)

## 2020-09-29 NOTE — NUR
WIFE HERMANN IN ROOM. PT REQUESTED AND WILL RECIEVE COFFEE. WANTS THE CPM
MACHINE OFF HIS LEG. STATED WE WOULD HAVE TO WAIT UNTIL 0815 BEFORE WE CAN DO
THAT. CL IN REACH. BED ALARM ON. WCTM.

## 2020-09-29 NOTE — NUR
CALL FROM NINFA RN MONITOR TECH REPORTS PT HAD 18 BEAT RUN OF V-TACH
REQUESTING CHART TO COMPAIR WITH PREVIOUS EKG, CHART SENT UPSTAIRS, INSTRUCTED
THIS NURSE TO CALL PRIMARY ONCALL WHICH IS ALAN

## 2020-09-29 NOTE — NUR
CALL TO ALAN WILLINGHAM CNP, INFORMED OF REPORTED 18 BEAT RUN OF V-TACH, ORDERES
RECIEVED, EKG DONE AND RESULTS CALLED TO SEE RESTREPO ANP ON CALL FOR
CARDIOLOGY, ORDERS RECIEVED TO TRANSFER TO [address] SUPPER VISOR NOTIFIED
FOR ROOM

## 2020-09-29 NOTE — NUR
ANSWERED PATIENT CALL LIGHT. PATIENT CONFUSED. TOOK OFF TELEMTREY AND
ATTEMPTING TO PULL OUT IV. CALMED PATIENT. WIFE AT BEDSIDE. ASSISTED PATIENT
WITH URINATING. VOIDED 550 CC OF LIGHT YELLOW URINE. PATIENT CALMED AFTER
VOIDING. REAPPLIED TELEMTREY. REAPPLIED SUHA HOSE BILATERALLY. IV REMAINS
PATENT. ASSISTED PATIENT TO COMFORTABLE POSITION. ATTEMPTING TO REST WHEN
EXITING THE ROOM. CALL LIGHT CLOSE. INSTRUCTED WIFE TO LET THIS NURSE KNOW OF
ANY NEEDS. CPOC.

## 2020-09-29 NOTE — NUR
VITALS ASSESSED. PATIENT DENIES PAIN, STATES "MEDS WORKED". REMOVED TEDS
BILATERALLY FOR ONE HOUR BREAK PER POLICY PROTOCAL.

## 2020-09-29 NOTE — NUR
PT INSISTING HE GET UP. PT AWARE IF HE GETS UP HE WILL FALL. REFUSES LUNCH.
WIFE IN ROOM NOW. CHAIR ALARM ON. WCTM

## 2020-09-29 NOTE — NUR
REPORT CALLED TO ROS ON MED 2, PT AND WIFE INFORMEND OF TRANSFER, DR WRIGHT ON CALL FOR ORTHO NOTIFIED OF TRANSFER

## 2020-09-30 VITALS — SYSTOLIC BLOOD PRESSURE: 130 MMHG | DIASTOLIC BLOOD PRESSURE: 72 MMHG

## 2020-09-30 VITALS — DIASTOLIC BLOOD PRESSURE: 84 MMHG | SYSTOLIC BLOOD PRESSURE: 136 MMHG

## 2020-09-30 VITALS — DIASTOLIC BLOOD PRESSURE: 49 MMHG | SYSTOLIC BLOOD PRESSURE: 135 MMHG

## 2020-09-30 VITALS — SYSTOLIC BLOOD PRESSURE: 138 MMHG | DIASTOLIC BLOOD PRESSURE: 83 MMHG

## 2020-09-30 VITALS — DIASTOLIC BLOOD PRESSURE: 83 MMHG | SYSTOLIC BLOOD PRESSURE: 131 MMHG

## 2020-09-30 LAB
ALBUMIN SERPL-MCNC: 3 G/DL (ref 3.4–5)
ALP SERPL-CCNC: 49 U/L (ref 30–120)
ALT SERPL-CCNC: 18 U/L (ref 10–68)
ALT SERPL-CCNC: 18 U/L (ref 10–68)
ANION GAP SERPL CALC-SCNC: 6.5 MMOL/L (ref 8–16)
ANION GAP SERPL CALC-SCNC: 7.3 MMOL/L (ref 8–16)
ANION GAP SERPL CALC-SCNC: 8.4 MMOL/L (ref 8–16)
APTT BLD: 35.1 SECONDS (ref 22.8–39.4)
BASOPHILS NFR BLD AUTO: 0.6 % (ref 0–2)
BILIRUB SERPL-MCNC: 0.55 MG/DL (ref 0.2–1.3)
BUN SERPL-MCNC: 13 MG/DL (ref 7–18)
BUN SERPL-MCNC: 13 MG/DL (ref 7–18)
BUN SERPL-MCNC: 14 MG/DL (ref 7–18)
CALCIUM SERPL-MCNC: 8 MG/DL (ref 8.5–10.1)
CALCIUM SERPL-MCNC: 8.1 MG/DL (ref 8.5–10.1)
CALCIUM SERPL-MCNC: 8.2 MG/DL (ref 8.5–10.1)
CHLORIDE SERPL-SCNC: 100 MMOL/L (ref 98–107)
CHLORIDE SERPL-SCNC: 101 MMOL/L (ref 98–107)
CHLORIDE SERPL-SCNC: 99 MMOL/L (ref 98–107)
CHOLEST/HDLC SERPL: 3 RATIO (ref 2.3–4.9)
CK MB SERPL-MCNC: 1.7 U/L (ref 0–3.6)
CK MB SERPL-MCNC: 2.1 U/L (ref 0–3.6)
CK MB SERPL-MCNC: 2.2 U/L (ref 0–3.6)
CK SERPL-CCNC: 296 UL (ref 21–232)
CK SERPL-CCNC: 303 UL (ref 21–232)
CK SERPL-CCNC: 336 UL (ref 21–232)
CO2 SERPL-SCNC: 27.9 MMOL/L (ref 21–32)
CO2 SERPL-SCNC: 29.6 MMOL/L (ref 21–32)
CO2 SERPL-SCNC: 31.2 MMOL/L (ref 21–32)
CREAT SERPL-MCNC: 1 MG/DL (ref 0.6–1.3)
CREAT SERPL-MCNC: 1 MG/DL (ref 0.6–1.3)
CREAT SERPL-MCNC: 1.1 MG/DL (ref 0.6–1.3)
EOSINOPHIL NFR BLD: 3.6 % (ref 0–7)
ERYTHROCYTE [DISTWIDTH] IN BLOOD BY AUTOMATED COUNT: 14 % (ref 11.5–14.5)
GLOBULIN SER-MCNC: 3.3 G/L
GLUCOSE SERPL-MCNC: 100 MG/DL (ref 74–106)
GLUCOSE SERPL-MCNC: 109 MG/DL (ref 74–106)
GLUCOSE SERPL-MCNC: 117 MG/DL (ref 74–106)
HCT VFR BLD CALC: 36 % (ref 42–54)
HDLC SERPL-MCNC: 56 MG/DL (ref 32–96)
HGB BLD-MCNC: 11.6 G/DL (ref 13.5–17.5)
IMM GRANULOCYTES NFR BLD: 0.2 % (ref 0–5)
INR PPP: 1.1 (ref 0.85–1.17)
LDL-HDL RATIO: 1.7 RATIO (ref 1.5–3.5)
LDLC SERPL-MCNC: 95 MG/DL (ref 0–100)
LYMPHOCYTES NFR BLD AUTO: 14.5 % (ref 15–50)
MAGNESIUM SERPL-MCNC: 1.8 MG/DL (ref 1.8–2.4)
MCH RBC QN AUTO: 28.6 PG (ref 26–34)
MCHC RBC AUTO-ENTMCNC: 32.2 G/DL (ref 31–37)
MCV RBC: 88.9 FL (ref 80–100)
MONOCYTES NFR BLD: 12.9 % (ref 2–11)
NEUTROPHILS NFR BLD AUTO: 68.2 % (ref 40–80)
OSMOLALITY SERPL CALC.SUM OF ELEC: 264 MOSM/KG (ref 275–300)
OSMOLALITY SERPL CALC.SUM OF ELEC: 265 MOSM/KG (ref 275–300)
OSMOLALITY SERPL CALC.SUM OF ELEC: 270 MOSM/KG (ref 275–300)
PLATELET # BLD: 162 10X3/UL (ref 130–400)
PMV BLD AUTO: 11 FL (ref 7.4–10.4)
POTASSIUM SERPL-SCNC: 3.7 MMOL/L (ref 3.5–5.1)
POTASSIUM SERPL-SCNC: 3.9 MMOL/L (ref 3.5–5.1)
POTASSIUM SERPL-SCNC: 4.3 MMOL/L (ref 3.5–5.1)
PROT SERPL-MCNC: 6.3 G/DL (ref 6.4–8.2)
PROTHROMBIN TIME: 14.2 SECONDS (ref 11.6–15)
RBC # BLD AUTO: 4.05 10X6/UL (ref 4.2–6.1)
SODIUM SERPL-SCNC: 131 MMOL/L (ref 136–145)
SODIUM SERPL-SCNC: 133 MMOL/L (ref 136–145)
SODIUM SERPL-SCNC: 135 MMOL/L (ref 136–145)
TRIGL SERPL-MCNC: 72 MG/DL (ref 30–200)
TROPONIN I SERPL-MCNC: < 0.017 NG/ML (ref 0–0.06)
TSH SERPL-ACNC: 1.03 UIU/ML (ref 0.36–3.74)
WBC # BLD AUTO: 8.1 10X3/UL (ref 4.8–10.8)

## 2020-09-30 NOTE — NUR
Just wanted to give you a head up. The CT scan done did show enlargement, but new pulmonary nodules. They want to discuss with you at upcoming appt. Thanks, Karyn RECEIVED BEDSIDE REPORT. ROUNDING COMPLETE. PATIENT IS ALERT AND PLEASANTLY
CONFUSED. RESPIRATIONS ARE EVEN AND UNLABORED. PATIENT USING CPM MACHINE. WIFE
REQUESTED ICE PACK FOR PATIENT KNEE. NEEDS MET. CALL LIGHT WITHIN REACH. WILL
CPOC.

## 2020-09-30 NOTE — OP
PATIENT NAME:  BETTY CHILDRESS                            MEDICAL RECORD: [id number]
:52                                             LOCATION:D.     D.2114
                                                         ADMISSION DATE:20
SURGEON:  CHANCE TERRELL MD        
 
 
DATE OF OPERATION:  2020
 
PREOPERATIVE DIAGNOSIS:  Degenerative arthritis, right knee.
 
POSTOPERATIVE DIAGNOSIS:  Degenerative arthritis, right knee.
 
PROCEDURE:  Right total knee arthroplasty.
 
SURGEON:  Chance Terrell MD
 
FIRST ASSISTANT:  KATARZYNA Crawley
 
ANESTHESIA:  General.
 
INTRAOPERATIVE COMPLICATIONS:  None.
 
SUMMARY OF PATHOLOGIC FINDINGS:  The patient had most severely patellofemoral
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  The patient
also had chondromalacia of the medial compartment.
 
IMPLANTS USED:  Ivan triathlon total knee arthroplasty size 5 tibial
component, size 5 cruciate retaining femoral component, size 33 symmetric
patella 9 mm, and size 5 x 9 tibial bearing insert X3 press fit.
 
OPERATIVE SUMMARY IN DETAIL:  After obtaining the appropriate preoperative
orthopedic surgery consent as well as anesthetic consultation, evaluation and
clearance, the patient was brought to the operating room and placed on the
operating table in supine position.  After adequate general laryngeal mask
airway was administered, tourniquet was placed on the proximal aspect of the
patient's right lower extremity.  Right lower extremity was prepped and draped
in routine sterile fashion.  At this point, the appropriate timeout was taken
and agreed upon by all given the patient identifiers.  The leg was elevated and
exsanguinated, tourniquet inflated to 350 mmHg.  Midline incision was taken down
for paramedian arthrotomy.  The paramedian arthrotomy was performed.  Patella
was everted and the distal femur was exposed.  Soft tissue excision was done in
the usual fashion.  An intramedullary guide hole was created for distal
intramedullary guided cut.  Distal femoral cut was made.  Attention was turned
to the proximal tibia.  With complete exposure of the proximal tibia and further
resection of residual meniscus, intramedullary guide hole was created here as
well.  Proximal tibia was cut and appropriate measurements were taken and then
distal femoral chamfer cuts were done as well.  Having completed distal femoral
chamfer cuts, the appropriate trials were placed corresponding to the above
final mentioned implants.  After final placement and adjustments, the knee was
taken through range of motion and found to be stable in all planes.  Having
completed this, final distal femoral preparations were completed followed by
proximal tibial preparations.  Having completed this, the very arthritic
articular surface of the patella was excised and prepared for press fit
implantation.  The knee cavity was irrigated and pulsatile lavaged to remove all
debris and then components were press fit into place.  Having completed the
press fit, the knee was taken through range of motion and found to be stable in
all planes.  At this point, a gram of vancomycin and a gram of tobramycin were
placed intra-articular.  The paramedian arthrotomy and skin were closed by Jourdan
 
 
 
OPERATIVE REPORT                               [id number]    BETTY CHILDRESS, FA with #2 Ethibond followed by #1 Vicryl, 2-0 Vicryl and skin staples. 
Sterile dressings were applied.  Tourniquet was deflated.  The patient was
awakened and taken to recovery room in stable condition.  All final needle and
sponge counts were correct.
 
TRANSINT:[id number] Voice Confirmation ID: [id number] DOCUMENT ID: [id number]
                                           
                                           NIDHI WELCH, CHANCE ALONSO        
 
 
 
Electronically Signed by CHANCE TERRELL MD on 20 at 1314
 
 
 
 
 
 
 
 
 
 
 
 
 
 
 
 
 
 
 
 
 
 
 
 
 
 
 
 
 
 
 
 
 
 
 
CC:                                                             [id number]
DICTATION DATE: 20     :     20 1230      ADM IN  
                                                                              
[address] [place] [address]

## 2020-09-30 NOTE — NUR
PT ARRIVED TO FLOOR VIA STRECHER WITH WIFE AT BEDSIDE. 20G IV STARTED IN PT
LOWER FA. DRIP AND BOLUS GIVEN BY HODAN BERNABE. PT 83-TELE. PLACED PT ON 2L FOR
91%. PT BEDFAST AND USES URINAL. BED LOW CALL LIGHT WITHIN REACH. WILL
CONTINUE TO MONITOR.

## 2020-10-01 VITALS — DIASTOLIC BLOOD PRESSURE: 59 MMHG | SYSTOLIC BLOOD PRESSURE: 101 MMHG

## 2020-10-01 VITALS — SYSTOLIC BLOOD PRESSURE: 155 MMHG | DIASTOLIC BLOOD PRESSURE: 91 MMHG

## 2020-10-01 VITALS — SYSTOLIC BLOOD PRESSURE: 140 MMHG | DIASTOLIC BLOOD PRESSURE: 82 MMHG

## 2020-10-01 VITALS — DIASTOLIC BLOOD PRESSURE: 78 MMHG | SYSTOLIC BLOOD PRESSURE: 125 MMHG

## 2020-10-01 VITALS — SYSTOLIC BLOOD PRESSURE: 115 MMHG | DIASTOLIC BLOOD PRESSURE: 73 MMHG

## 2020-10-01 LAB
ALBUMIN SERPL-MCNC: 2.8 G/DL (ref 3.4–5)
ALP SERPL-CCNC: 52 U/L (ref 30–120)
ALT SERPL-CCNC: 19 U/L (ref 10–68)
ANION GAP SERPL CALC-SCNC: 7 MMOL/L (ref 8–16)
BASOPHILS NFR BLD AUTO: 0.4 % (ref 0–2)
BILIRUB SERPL-MCNC: 0.75 MG/DL (ref 0.2–1.3)
BUN SERPL-MCNC: 11 MG/DL (ref 7–18)
CALCIUM SERPL-MCNC: 8.5 MG/DL (ref 8.5–10.1)
CHLORIDE SERPL-SCNC: 101 MMOL/L (ref 98–107)
CO2 SERPL-SCNC: 30.8 MMOL/L (ref 21–32)
CREAT SERPL-MCNC: 1.1 MG/DL (ref 0.6–1.3)
EOSINOPHIL NFR BLD: 4.5 % (ref 0–7)
ERYTHROCYTE [DISTWIDTH] IN BLOOD BY AUTOMATED COUNT: 14 % (ref 11.5–14.5)
GLOBULIN SER-MCNC: 3.6 G/L
GLUCOSE SERPL-MCNC: 106 MG/DL (ref 74–106)
HCT VFR BLD CALC: 35.8 % (ref 42–54)
HGB BLD-MCNC: 11.8 G/DL (ref 13.5–17.5)
IMM GRANULOCYTES NFR BLD: 0.7 % (ref 0–5)
LYMPHOCYTES NFR BLD AUTO: 13.3 % (ref 15–50)
MCH RBC QN AUTO: 29.4 PG (ref 26–34)
MCHC RBC AUTO-ENTMCNC: 33 G/DL (ref 31–37)
MCV RBC: 89.1 FL (ref 80–100)
MONOCYTES NFR BLD: 12.7 % (ref 2–11)
NEUTROPHILS NFR BLD AUTO: 68.4 % (ref 40–80)
OSMOLALITY SERPL CALC.SUM OF ELEC: 268 MOSM/KG (ref 275–300)
PLATELET # BLD: 172 10X3/UL (ref 130–400)
PMV BLD AUTO: 10.9 FL (ref 7.4–10.4)
POTASSIUM SERPL-SCNC: 3.8 MMOL/L (ref 3.5–5.1)
PROT SERPL-MCNC: 6.4 G/DL (ref 6.4–8.2)
RBC # BLD AUTO: 4.02 10X6/UL (ref 4.2–6.1)
SODIUM SERPL-SCNC: 135 MMOL/L (ref 136–145)
WBC # BLD AUTO: 8.1 10X3/UL (ref 4.8–10.8)

## 2020-10-01 NOTE — MORECARE
CASE MANAGEMENT DISCHARGE SUMMARY
 
 
PATIENT: BETTY LIRA                      UNIT: [id number]
ACCOUNT#: [id number]                       ADM DATE: 20
AGE: 70     : 52  SEX: M            ROOM/BED: D.2114    
AUTHOR: LAYLA,DOC                             PHYSICIAN:                               
 
REFERRING PHYSICIAN: CHANCE TERRELL MD        
DATE OF SERVICE: 10/01/20
Discharge Plan
 
 
Patient Name: BETTY LIRA
Facility: [facility]:[place]
Encounter #: [id number]
Medical Record #: [id number]
: 1950
Planned Disposition: 
Anticipated Discharge Date: 10/1/20
 
Discharge Date: 
Expected LOS: 3
Initial Reviewer: [id number]
Initial Review Date: 2020
Generated: 10/1/20   2:22 pm 
Comments
 
DCP- Discharge Planning
 
Updated by [email or web address]: Geno Ewing on 20  12:55 pm CT
CM met with patient and wife, Tamika Lira ([phone]) regarding dc plans. 
Patient is drowsy and request that his in interview. Patient is independent 
PTA, with ADL's, medication management. PCP: Dr. Cano. Pharmacy: [facility]. DME: walker, cane. A CPM was delivered to the home, as well PTA. 
CM discussed HHS, OP Therapy, Rehab, SNF. PT has not worked with the patient 
as yet, so CM will re-visit tomorrow in order to determine DC needs. CM left 
a copy of HH agencies and OP Therapy with the patient's wife. CM will follow 
and assist PRN with DC needs.
 DCPIA - Discharge Planning Initial Assessment
 
Updated by [email or web address]: Geno Ewing on 20   1:58 pm
*  Is the patient Alert and Oriented?
Yes
*  How many steps to enter\exit or inside your home? 3 wide w/r *  PCP Dr. Cano * 
Pharmacy
[facility], [address]
*  Preadmission Environment
Home with Family
*  ADLs
 
Independent
*  Equipment
Cane
Rolling Walker
*  Other Equipment
CPM
*  List name and contact numbers for known caregivers / representatives who 
currently or will assist patient after discharge:
Tamika Lira (wife) [phone]
*  Verbal permission to speak to the caregivers and representatives has been 
obtained from the patient.
Yes
*  Community resources currently utilized
None
*  Additional services required to return to the preadmission environment?
Yes
*  Can the patient safely return to the preadmission environment?
Yes
*  Has this patient been hospitalized within the prior 30 days at any 
hospital?
No
 
External Providers
External Provider: [facility]
 
Next Contact Date: 
Service Request Date: 
Service Type: 
Resolution: 
 
Reviewer: 
Comments: 
 
 
 
 
Coverage Notice
 
Reviewer: [id number] - Geno Ewing
 
Notice Issued Date-Time: 2020  14:57
Notice Type: Patient Choice Letter
 
Notice Delivered To: Patient
Relationship to Patient: Self
Representative Name: Tamika Lira
 
Delivery Method: HAND - Hand Delivered
Jennifer Days:
Prior Verbal Notification: 
 
Recipient Understood Notice: Yes
 
Recipient Signature: Yes
Med Rec Note Co-signed by Attending:
 
Coverage Notice Comment:  NP OP Therapy
 
Last DP export: 20   1:01 p
Patient Name: BETTY LIRA
Encounter #: [id number]
Page [phone]
 
 
 
 
 
Electronically Signed by VIVI RICH on 10/01/20 at 1323
 
 
 
 
 
 
**All edits/amendments must be made on the electronic document**
 
DICTATION DATE: 10/01/20 1322     : POLA  10/01/20 1322     
RPT#: [id number]                                DC DATE:        
                                               STATUS: ADM IN  
[facility]
[address] [place] [address]
***END OF REPORT***

## 2020-10-01 NOTE — MORECARE
CASE MANAGEMENT DISCHARGE SUMMARY
 
 
PATIENT: BETTY LIRA                      UNIT: [id number]
ACCOUNT#: [id number]                       ADM DATE: 20
AGE: 70     : 52  SEX: M            ROOM/BED: D.2114    
AUTHOR: LAYLA,DOC                             PHYSICIAN:                               
 
REFERRING PHYSICIAN: CHANCE TERRELL MD        
DATE OF SERVICE: 10/01/20
Discharge Plan
 
 
Patient Name: BETTY LIRA
Facility: [facility]:[place]
Encounter #: [id number]
Medical Record #: [id number]
: 1950
Planned Disposition: 
Anticipated Discharge Date: 10/1/20
 
Discharge Date: 
Expected LOS: 3
Initial Reviewer: [email or web address]
Initial Review Date: 2020
Generated: 10/1/20   2:31 pm 
Comments
 
DCP- Discharge Planning
 
Updated by [email or web address]: Doreen Sales on 10/1/20  12:24 pm CT
CM noted PT notes, recommending rehab. I spoke with patient, he is confused 
and unable to choose a rehab facility. I spoke with patient's wife on the 
phone and explained inpatient rehab vs SNF. She would like a referral to [facility]. I spoke with Ana Rosa with [facility] and referral faxed. CM will 
continue to follow and assist with discharge planning/needs.
DCP- Discharge Planning
 
Updated by [email or web address]: Geno Ewing on 20  12:55 pm CT
CM met with patient and wife, Tamika Lira ([phone]) regarding dc plans. 
Patient is drowsy and request that his in interview. Patient is independent 
PTA, with ADL's, medication management. PCP: Dr. Cano. Pharmacy: [facility], 
[address] DME: walker, cane. A CPM was delivered to the home, as well PTA. 
CM discussed HHS, OP Therapy, Rehab, SNF. PT has not worked with the patient 
as yet, so CM will re-visit tomorrow in order to determine DC needs. CM left 
a copy of HH agencies and OP Therapy with the patient's wife. CM will follow 
and assist PRN with DC needs.
 DCPIA - Discharge Planning Initial Assessment
 
Updated by [email or web address]: Geno Ewing on 20   1:58 pm
 
*  Is the patient Alert and Oriented?
Yes
*  How many steps to enter\exit or inside your home? 3 wide w/r *  PCP Dr. Cano * 
Pharmacy
[facility]
*  Preadmission Environment
Home with Family
*  ADLs
Independent
*  Equipment
Cane
Rolling Walker
*  Other Equipment
CPM
*  List name and contact numbers for known caregivers / representatives who 
currently or will assist patient after discharge:
Tamika Lira (wife) [phone]
*  Verbal permission to speak to the caregivers and representatives has been 
obtained from the patient.
Yes
*  Community resources currently utilized
None
*  Additional services required to return to the preadmission environment?
Yes
*  Can the patient safely return to the preadmission environment?
Yes
*  Has this patient been hospitalized within the prior 30 days at any 
hospital?
No
 
 
 
 
 
Coverage Notice
 
Reviewer: [email or web address] Mary Ewing
 
Notice Issued Date-Time: 2020  14:57
Notice Type: Patient Choice Letter
 
Notice Delivered To: Patient
Relationship to Patient: Self
Representative Name: Tamika Lira
 
Delivery Method: HAND - Hand Delivered
Jennifer Days:
Prior Verbal Notification: 
 
Recipient Understood Notice: Yes
Recipient Signature: Yes
Med Rec Note Co-signed by Attending:
 
 
Coverage Notice Comment:  NP OP Therapy
Reviewer: [email or web address] Mary Sales
 
Notice Issued Date-Time: 10/01/2020  13:25
Notice Type: Patient Choice Letter
 
Notice Delivered To: Family Member
Relationship to Patient: Spouse
Representative Name: Tamika Lira
 
Delivery Method: PHONE - Phone
Jennifer Days:
Prior Verbal Notification: 
 
Recipient Understood Notice: Yes
Recipient Signature: 
Med Rec Note Co-signed by Attending:
 
Coverage Notice Comment:  [facility]
 
Last DP export: 10/1/20  12:23 p
Patient Name: BETTY LIRA
Encounter #: [id number]
Page [phone]
 
 
 
 
 
Electronically Signed by VIVI RICH on 10/01/20 at 1332
 
 
 
 
 
 
**All edits/amendments must be made on the electronic document**
 
DICTATION DATE: 10/01/20 1331     : DM  10/01/20 1331     
RPT#: [id number]                                DC DATE:        
                                               STATUS: ADM IN  
[facility]
[address] [place] [address]
***END OF REPORT***

## 2020-10-01 NOTE — NUR
AIR SLOWLY RELEASED FROM BAND TO RIGHT WRIST AND TAKEN OFF WITH NO BLEEDING
NOTED AT 17:10, BANDAID APPLIED.

## 2020-10-01 NOTE — NUR
PATIENT DISCHARGED FROM DR WORKMAN CARE WITH ORDERS OF PLAVIX 75 MG FOR 1 MONTH
WITH 5 REFILLS, LASIX 40 MG DAILY, CORDORONE 200 MG BID AND RETURN TO SEE DR WORKMAN IN 4 WEEKS IN THE CLINIC. CARE TURNED OVER TO DR TERRELL.

## 2020-10-02 VITALS — DIASTOLIC BLOOD PRESSURE: 69 MMHG | SYSTOLIC BLOOD PRESSURE: 119 MMHG

## 2020-10-02 VITALS — SYSTOLIC BLOOD PRESSURE: 118 MMHG | DIASTOLIC BLOOD PRESSURE: 58 MMHG

## 2020-10-02 VITALS — DIASTOLIC BLOOD PRESSURE: 73 MMHG | SYSTOLIC BLOOD PRESSURE: 132 MMHG

## 2020-10-02 VITALS — DIASTOLIC BLOOD PRESSURE: 58 MMHG | SYSTOLIC BLOOD PRESSURE: 116 MMHG

## 2020-10-02 LAB
ALBUMIN SERPL-MCNC: 2.7 G/DL (ref 3.4–5)
ALP SERPL-CCNC: 62 U/L (ref 30–120)
ALT SERPL-CCNC: 26 U/L (ref 10–68)
ANION GAP SERPL CALC-SCNC: 10.3 MMOL/L (ref 8–16)
BASOPHILS NFR BLD AUTO: 0.3 % (ref 0–2)
BILIRUB SERPL-MCNC: 0.87 MG/DL (ref 0.2–1.3)
BUN SERPL-MCNC: 15 MG/DL (ref 7–18)
CALCIUM SERPL-MCNC: 8.3 MG/DL (ref 8.5–10.1)
CHLORIDE SERPL-SCNC: 101 MMOL/L (ref 98–107)
CO2 SERPL-SCNC: 29.4 MMOL/L (ref 21–32)
CREAT SERPL-MCNC: 1.1 MG/DL (ref 0.6–1.3)
EOSINOPHIL NFR BLD: 4.9 % (ref 0–7)
ERYTHROCYTE [DISTWIDTH] IN BLOOD BY AUTOMATED COUNT: 14 % (ref 11.5–14.5)
GLOBULIN SER-MCNC: 3.2 G/L
GLUCOSE SERPL-MCNC: 104 MG/DL (ref 74–106)
HCT VFR BLD CALC: 36 % (ref 42–54)
HGB BLD-MCNC: 11.7 G/DL (ref 13.5–17.5)
IMM GRANULOCYTES NFR BLD: 0.8 % (ref 0–5)
LYMPHOCYTES NFR BLD AUTO: 12.6 % (ref 15–50)
MCH RBC QN AUTO: 29 PG (ref 26–34)
MCHC RBC AUTO-ENTMCNC: 32.5 G/DL (ref 31–37)
MCV RBC: 89.1 FL (ref 80–100)
MONOCYTES NFR BLD: 12.2 % (ref 2–11)
NEUTROPHILS NFR BLD AUTO: 69.2 % (ref 40–80)
OSMOLALITY SERPL CALC.SUM OF ELEC: 274 MOSM/KG (ref 275–300)
PLATELET # BLD: 196 10X3/UL (ref 130–400)
PMV BLD AUTO: 11 FL (ref 7.4–10.4)
POTASSIUM SERPL-SCNC: 3.7 MMOL/L (ref 3.5–5.1)
PROT SERPL-MCNC: 5.9 G/DL (ref 6.4–8.2)
RBC # BLD AUTO: 4.04 10X6/UL (ref 4.2–6.1)
SODIUM SERPL-SCNC: 137 MMOL/L (ref 136–145)
WBC # BLD AUTO: 8.7 10X3/UL (ref 4.8–10.8)

## 2020-10-02 NOTE — NUR
RIGHT KNEE DRESSING CHANGE. INCISION CLEAN AND DRY. NO DRAINAGE VISIBLE.
AQUACEL AG DRESSING PLACED OVER INCISION.

## 2020-10-02 NOTE — NUR
Nutrition Follow-up:
Eating well. Ate 100% of breakfast this AM.
Diet: Regular
PO intake: 97% avg x 8 meals
Wt: 276# (9/29)
Labs noted: Ca 8.3, Alb 2.7
Meds noted: Lasix, Protonix, electrolyte protocol
-RD following.

## 2020-10-02 NOTE — OP
PATIENT NAME:  BETTY CHILDRESS                            MEDICAL RECORD: [id number]
:52                                             LOCATION:D.M2     D.2114
                                                         ADMISSION DATE:20
SURGEON:  RUPA MURILLO MD          
 
 
DATE OF OPERATION:  10/01/2020
 
PROCEDURE:  Left heart catheterization, selective coronary angiography, right
radial approach.
 
CATHETERS:  Radial sheath, Tiger catheter.
 
The procedure was well tolerated.  The patient returned to gordillo.  Sheath
removed.  TR band was placed.
 
FINDINGS:  Left ventriculography in 30-degree MELCHOR view; globally hypokinetic
with reduced EF, estimated at 20%.
 
CORONARY ANATOMY:
LEFT MAIN:  Left main is free of disease.
LAD:  Has luminal irregularities with no flow obstructive stenosis.
CIRCUMFLEX:  Totally occluded, fills via right to left collaterals.
RIGHT CORONARY ARTERY:  Right coronary artery has severe complex 80% stenosis in
its mid portion.  This does give rise to circumflex collaterals as well.
 
PLAN:  Intervention momentarily.
 
DESCRIPTION OF PROCEDURE:  Using indwelling radial sheath, a hockey-stick
guiding catheter provided fair guiding catheter support followed by a 300 cm BMW
was placed across the tightly occluded 80% right down this portion of vessel. 
Stent deployed was a 4.0 x 18 mm Integrity nondrug-eluting stent up to 14
atmospheres for 45 seconds.  Final angiography shows excellent resolution
complex 80% stenosis, no significant residual.  This vessel supplies the
circumflex as well, hopefully, this will have improved LV function in the
interim given mild volume overload we will start loop diuretic as well as
Dobutrex for inotropic support. 
 
TRANSINT:[id number] Voice Confirmation ID: [id number] DOCUMENT ID: [id number]
                                           
                                           RUPA MURILLO MD          
 
 
 
Electronically Signed by RUPA MURILLO on 10/02/20 at 0908
 
 
 
 
 
CC:                                                             [id number]
DICTATION DATE: 10/01/20 1128     :     10/01/20 1913      ADM IN  
                                                                              
[facility]                                          
[address] [place] [address]

## 2020-10-02 NOTE — NUR
PT REFUSED TO WEAR CPM. NURSES X 3 EDUCATED PT ON IMPORTANCE OF DEVICE PT
STATED "IT HURT TOO BAD NO." WILL NOTIFY MD.

## 2020-10-03 VITALS — SYSTOLIC BLOOD PRESSURE: 130 MMHG | DIASTOLIC BLOOD PRESSURE: 63 MMHG

## 2020-10-03 VITALS — SYSTOLIC BLOOD PRESSURE: 115 MMHG | DIASTOLIC BLOOD PRESSURE: 67 MMHG

## 2020-10-03 VITALS — DIASTOLIC BLOOD PRESSURE: 70 MMHG | SYSTOLIC BLOOD PRESSURE: 120 MMHG

## 2020-10-03 VITALS — DIASTOLIC BLOOD PRESSURE: 67 MMHG | SYSTOLIC BLOOD PRESSURE: 112 MMHG

## 2020-10-03 LAB
ALBUMIN SERPL-MCNC: 2.7 G/DL (ref 3.4–5)
ALP SERPL-CCNC: 68 U/L (ref 30–120)
ALT SERPL-CCNC: 29 U/L (ref 10–68)
ANION GAP SERPL CALC-SCNC: 10.8 MMOL/L (ref 8–16)
BASOPHILS NFR BLD AUTO: 0.3 % (ref 0–2)
BILIRUB SERPL-MCNC: 1 MG/DL (ref 0.2–1.3)
BUN SERPL-MCNC: 22 MG/DL (ref 7–18)
CALCIUM SERPL-MCNC: 8.4 MG/DL (ref 8.5–10.1)
CHLORIDE SERPL-SCNC: 100 MMOL/L (ref 98–107)
CO2 SERPL-SCNC: 29.6 MMOL/L (ref 21–32)
CREAT SERPL-MCNC: 1.4 MG/DL (ref 0.6–1.3)
EOSINOPHIL NFR BLD: 1.8 % (ref 0–7)
ERYTHROCYTE [DISTWIDTH] IN BLOOD BY AUTOMATED COUNT: 14 % (ref 11.5–14.5)
GLOBULIN SER-MCNC: 4 G/L
GLUCOSE SERPL-MCNC: 120 MG/DL (ref 74–106)
HCT VFR BLD CALC: 34.4 % (ref 42–54)
HGB BLD-MCNC: 11.3 G/DL (ref 13.5–17.5)
IMM GRANULOCYTES NFR BLD: 0.9 % (ref 0–5)
LYMPHOCYTES NFR BLD AUTO: 10.5 % (ref 15–50)
MCH RBC QN AUTO: 29 PG (ref 26–34)
MCHC RBC AUTO-ENTMCNC: 32.8 G/DL (ref 31–37)
MCV RBC: 88.2 FL (ref 80–100)
MONOCYTES NFR BLD: 13.7 % (ref 2–11)
NEUTROPHILS NFR BLD AUTO: 72.8 % (ref 40–80)
OSMOLALITY SERPL CALC.SUM OF ELEC: 277 MOSM/KG (ref 275–300)
PLATELET # BLD: 223 10X3/UL (ref 130–400)
PMV BLD AUTO: 10.7 FL (ref 7.4–10.4)
POTASSIUM SERPL-SCNC: 3.4 MMOL/L (ref 3.5–5.1)
PROT SERPL-MCNC: 6.7 G/DL (ref 6.4–8.2)
RBC # BLD AUTO: 3.9 10X6/UL (ref 4.2–6.1)
SODIUM SERPL-SCNC: 137 MMOL/L (ref 136–145)
WBC # BLD AUTO: 9.9 10X3/UL (ref 4.8–10.8)

## 2020-10-04 VITALS — SYSTOLIC BLOOD PRESSURE: 115 MMHG | DIASTOLIC BLOOD PRESSURE: 57 MMHG

## 2020-10-04 VITALS — SYSTOLIC BLOOD PRESSURE: 147 MMHG | DIASTOLIC BLOOD PRESSURE: 75 MMHG

## 2020-10-04 VITALS — SYSTOLIC BLOOD PRESSURE: 117 MMHG | DIASTOLIC BLOOD PRESSURE: 69 MMHG

## 2020-10-04 VITALS — SYSTOLIC BLOOD PRESSURE: 100 MMHG | DIASTOLIC BLOOD PRESSURE: 48 MMHG

## 2020-10-04 VITALS — DIASTOLIC BLOOD PRESSURE: 85 MMHG | SYSTOLIC BLOOD PRESSURE: 140 MMHG

## 2020-10-04 LAB
ALBUMIN SERPL-MCNC: 2.8 G/DL (ref 3.4–5)
ALP SERPL-CCNC: 69 U/L (ref 30–120)
ALT SERPL-CCNC: 36 U/L (ref 10–68)
ANION GAP SERPL CALC-SCNC: 12.2 MMOL/L (ref 8–16)
BASOPHILS NFR BLD AUTO: 0.4 % (ref 0–2)
BILIRUB SERPL-MCNC: 1.09 MG/DL (ref 0.2–1.3)
BUN SERPL-MCNC: 21 MG/DL (ref 7–18)
CALCIUM SERPL-MCNC: 8.7 MG/DL (ref 8.5–10.1)
CHLORIDE SERPL-SCNC: 99 MMOL/L (ref 98–107)
CO2 SERPL-SCNC: 29.1 MMOL/L (ref 21–32)
CREAT SERPL-MCNC: 1.2 MG/DL (ref 0.6–1.3)
EOSINOPHIL NFR BLD: 7.5 % (ref 0–7)
ERYTHROCYTE [DISTWIDTH] IN BLOOD BY AUTOMATED COUNT: 13.7 % (ref 11.5–14.5)
GLOBULIN SER-MCNC: 4.2 G/L
GLUCOSE SERPL-MCNC: 97 MG/DL (ref 74–106)
HCT VFR BLD CALC: 37 % (ref 42–54)
HGB BLD-MCNC: 12.2 G/DL (ref 13.5–17.5)
IMM GRANULOCYTES NFR BLD: 1.8 % (ref 0–5)
LYMPHOCYTES NFR BLD AUTO: 13.6 % (ref 15–50)
MCH RBC QN AUTO: 29.4 PG (ref 26–34)
MCHC RBC AUTO-ENTMCNC: 33 G/DL (ref 31–37)
MCV RBC: 89.2 FL (ref 80–100)
MONOCYTES NFR BLD: 13.8 % (ref 2–11)
NEUTROPHILS NFR BLD AUTO: 62.9 % (ref 40–80)
OSMOLALITY SERPL CALC.SUM OF ELEC: 276 MOSM/KG (ref 275–300)
PLATELET # BLD: 226 10X3/UL (ref 130–400)
PMV BLD AUTO: 10.5 FL (ref 7.4–10.4)
POTASSIUM SERPL-SCNC: 3.3 MMOL/L (ref 3.5–5.1)
PROT SERPL-MCNC: 7 G/DL (ref 6.4–8.2)
RBC # BLD AUTO: 4.15 10X6/UL (ref 4.2–6.1)
SODIUM SERPL-SCNC: 137 MMOL/L (ref 136–145)
WBC # BLD AUTO: 9.1 10X3/UL (ref 4.8–10.8)

## 2020-10-05 VITALS — DIASTOLIC BLOOD PRESSURE: 66 MMHG | SYSTOLIC BLOOD PRESSURE: 130 MMHG

## 2020-10-05 VITALS — DIASTOLIC BLOOD PRESSURE: 74 MMHG | SYSTOLIC BLOOD PRESSURE: 114 MMHG

## 2020-10-05 VITALS — SYSTOLIC BLOOD PRESSURE: 117 MMHG | DIASTOLIC BLOOD PRESSURE: 67 MMHG

## 2020-10-05 VITALS — SYSTOLIC BLOOD PRESSURE: 124 MMHG | DIASTOLIC BLOOD PRESSURE: 72 MMHG

## 2020-10-05 LAB
ALBUMIN SERPL-MCNC: 2.8 G/DL (ref 3.4–5)
ALP SERPL-CCNC: 79 U/L (ref 30–120)
ALT SERPL-CCNC: 53 U/L (ref 10–68)
ANION GAP SERPL CALC-SCNC: 8.7 MMOL/L (ref 8–16)
BASOPHILS NFR BLD AUTO: 0.6 % (ref 0–2)
BILIRUB SERPL-MCNC: 0.85 MG/DL (ref 0.2–1.3)
BUN SERPL-MCNC: 24 MG/DL (ref 7–18)
CALCIUM SERPL-MCNC: 9 MG/DL (ref 8.5–10.1)
CHLORIDE SERPL-SCNC: 98 MMOL/L (ref 98–107)
CO2 SERPL-SCNC: 31.8 MMOL/L (ref 21–32)
CREAT SERPL-MCNC: 1.3 MG/DL (ref 0.6–1.3)
EOSINOPHIL NFR BLD: 9.3 % (ref 0–7)
ERYTHROCYTE [DISTWIDTH] IN BLOOD BY AUTOMATED COUNT: 13.4 % (ref 11.5–14.5)
GLOBULIN SER-MCNC: 4.2 G/L
GLUCOSE SERPL-MCNC: 102 MG/DL (ref 74–106)
HCT VFR BLD CALC: 36.6 % (ref 42–54)
HGB BLD-MCNC: 11.9 G/DL (ref 13.5–17.5)
IMM GRANULOCYTES NFR BLD: 1.9 % (ref 0–5)
LYMPHOCYTES NFR BLD AUTO: 13.7 % (ref 15–50)
MCH RBC QN AUTO: 29 PG (ref 26–34)
MCHC RBC AUTO-ENTMCNC: 32.5 G/DL (ref 31–37)
MCV RBC: 89.3 FL (ref 80–100)
MONOCYTES NFR BLD: 12.4 % (ref 2–11)
NEUTROPHILS NFR BLD AUTO: 62.1 % (ref 40–80)
NT-PROBNP SERPL-MCNC: 565 PG/ML (ref 0–125)
OSMOLALITY SERPL CALC.SUM OF ELEC: 273 MOSM/KG (ref 275–300)
PLATELET # BLD: 263 10X3/UL (ref 130–400)
PMV BLD AUTO: 10.2 FL (ref 7.4–10.4)
POTASSIUM SERPL-SCNC: 3.5 MMOL/L (ref 3.5–5.1)
PROT SERPL-MCNC: 7 G/DL (ref 6.4–8.2)
RBC # BLD AUTO: 4.1 10X6/UL (ref 4.2–6.1)
SODIUM SERPL-SCNC: 135 MMOL/L (ref 136–145)
WBC # BLD AUTO: 9 10X3/UL (ref 4.8–10.8)

## 2020-10-05 NOTE — NUR
OT NOTE: PT LIEING IN BED..INCREASED VERBALIZATION TODAY, HOWEVER, ALSO
INCREASED CONFUSION/DISORIENTATION. WIFE AT BEDSIDE..STEPPED OUT FOR TMT.  PT
REQUIRED MOD ASSIST X 2 FOR SUPINE TO SIT; SITTING BALANCE GOOD ONCE BOTH FEET
WERE ON FLOOR. PT A AND O X PERSON ONLY.  EVEN WITH SEVERAL CUES, HE WAS
UNABLE TO ORIENT TO PLACE.  PRACTICED NUMEROUS TIMES WHAT TO DO FOR ANY
NEEDS.. FINALLY WITH SEVERAL CUES AND CALL LIGHT IN HAND, HE KNEW TO PUSH THE
TOP BUTTON. ASSISTED PT WITH SIT TO STAND WITH MOD ASSIST..TRANSFERRED FROM
BED TO CHAIR WITH MOD ASSIST X 2. PROVIDED PT WITH WASH CLOTH FOR FACE AND
HANDS. MAX ASSIST TO CLEAN BACK AND LES. MAX ASSIST WITH LE DRESSING. AGAIN
CONTINUED EDUCATION REGARDING
USE OF CALL LIGHT. PT CONT TO HAVE DIFFICULTY DUE TO CONFUSION.
ABDI FERNANDEZ, OTR/L
[phone]

## 2020-10-05 NOTE — NUR
RECEIVED BEDSIDE REPORT. ROUNDING COMPLETE. PATIENT IS ALERT AND PLEASANTLY
CONFUSED. RESSPIRATIONS ARE EVEN AND UNLABORED. NO S/S OF DISTRESS. NO C/O
PAIN. CALL LIGHT WITHIN REACH. WILL CPOC.

## 2020-10-05 NOTE — MORECARE
CASE MANAGEMENT DISCHARGE SUMMARY
 
 
PATIENT: BETTY LIRA                      UNIT: [id number]
ACCOUNT#: [id number]                       ADM DATE: 20
AGE: 70     : 52  SEX: M            ROOM/BED: D.2114    
AUTHOR: LAYLA,DOC                             PHYSICIAN:                               
 
REFERRING PHYSICIAN: CHANCE TERRELL MD        
DATE OF SERVICE: 10/05/20
Discharge Plan
 
 
Patient Name: BETTY LIRA
Facility: [facility]:[place]
Encounter #: [id number]
Medical Record #: [id number]
: 1950
Planned Disposition: Skilled Nursing Facility
Anticipated Discharge Date: 10/1/20
 
Discharge Date: 
Expected LOS: 3
Initial Reviewer: [id number]
Initial Review Date: 2020
Generated: 10/5/20  10:38 am 
Comments
 
DCP- Discharge Planning
 
Updated by [id number]: Geno Ewing on 10/5/20   8:28 am CT
CM contacted [facility], who states she will have a male bed available 
10/6.
DCP- Discharge Planning
 
Updated by [id number]: Doreen Sales on 10/1/20  12:24 pm CT
CM noted PT notes, recommending rehab. I spoke with patient, he is confused 
and unable to choose a rehab facility. I spoke with patient's wife on the 
phone and explained inpatient rehab vs SNF. She would like a referral to [facility]. I spoke with Ana Rosa with [facility] and referral faxed. CM will 
continue to follow and assist with discharge planning/needs.
DCP- Discharge Planning
 
Updated by [email or web address]: Geno Ewing on 20  12:55 pm CT
CM met with patient and wife, Tamika Lira ([phone]) regarding dc plans. 
Patient is drowsy and request that his in interview. Patient is independent 
PTA, with ADL's, medication management. PCP: Dr. Cano. Pharmacy: [facility], 
[address] DME: walker, cane. A CPM was delivered to the home, as well PTA. 
CM discussed HHS, OP Therapy, Rehab, SNF. PT has not worked with the patient 
as yet, so CM will re-visit tomorrow in order to determine DC needs. CM left 
 
a copy of HH agencies and OP Therapy with the patient's wife. CM will follow 
and assist PRN with DC needs.
 DCPIA - Discharge Planning Initial Assessment
 
Updated by [email or web address]: Geno Ewing on 20   1:58 pm
*  Is the patient Alert and Oriented?
Yes
*  How many steps to enter\exit or inside your home? 3 wide w/r *  PCP Dr. Cano * 
Pharmacy
[facility]
*  Preadmission Environment
Home with Family
*  ADLs
Independent
*  Equipment
Cane
Rolling Walker
*  Other Equipment
CPM
*  List name and contact numbers for known caregivers / representatives who 
currently or will assist patient after discharge:
Tamika Lira (wife) [phone]
*  Verbal permission to speak to the caregivers and representatives has been 
obtained from the patient.
Yes
*  Community resources currently utilized
None
*  Additional services required to return to the preadmission environment?
Yes
*  Can the patient safely return to the preadmission environment?
Yes
*  Has this patient been hospitalized within the prior 30 days at any 
hospital?
No
 
 
 
 
 
Coverage Notice
 
Reviewer: [id number] - Genokatie Ewing
 
Notice Issued Date-Time: 2020  14:57
Notice Type: Patient Choice Letter
 
Notice Delivered To: Patient
Relationship to Patient: Self
Representative Name: Tamika Lira
 
Delivery Method: HAND - Hand Delivered
Jennifer Days:
 
Prior Verbal Notification: 
 
Recipient Understood Notice: Yes
Recipient Signature: Yes
Med Rec Note Co-signed by Attending:
 
Coverage Notice Comment:  NP OP Therapy
Reviewer: [email or web address] - Doreen Sales
 
Notice Issued Date-Time: 10/01/2020  13:25
Notice Type: Patient Choice Letter
 
Notice Delivered To: Family Member
Relationship to Patient: Spouse
Representative Name: Tamika Lira
 
Delivery Method: PHONE - Phone
Jennifer Days:
Prior Verbal Notification: 
 
Recipient Understood Notice: Yes
Recipient Signature: 
Med Rec Note Co-signed by Attending:
 
Coverage Notice Comment:  [facility]
 
Last DP export: 10/1/20  12:32 p
Patient Name: BETTY LIRA
Encounter #: [id number]
Page [phone]
 
 
 
 
 
Electronically Signed by VIVI RICH on 10/05/20 at 0939
 
 
 
 
 
 
**All edits/amendments must be made on the electronic document**
 
DICTATION DATE: 10/05/20 0939     : POLA  10/05/20 0939     
RPT#: [id number]                                DC DATE:        
                                               STATUS: ADM IN  
[facility]
[address] [place] [address]
***END OF REPORT***

## 2020-10-05 NOTE — NUR
CPM REMOVED AFTER 3 HOURS OF THERAPY. AM MEDICATIONS ADMININSTERED. PT APPEARS
MORE ORIENTED THAN NORMAL. WIFE AT BEDSIDE. FALL PRECAUTIONS IN PLACE. WILL
CTM.

## 2020-10-05 NOTE — MORECARE
CASE MANAGEMENT DISCHARGE SUMMARY
 
 
PATIENT: BETTY LIRA                      UNIT: [id number]
ACCOUNT#: [id number]                       ADM DATE: 20
AGE: 70     : 52  SEX: M            ROOM/BED: D.4524    
AUTHOR: LAYLA,DOC                             PHYSICIAN:                               
 
REFERRING PHYSICIAN: CHANCE TERRELL MD        
DATE OF SERVICE: 10/05/20
Discharge Plan
 
 
Patient Name: BETTY LIRA
Facility: [facility]
Encounter #: [id number]
Medical Record #: [id number]
: 1950
Planned Disposition: Skilled Nursing Facility
Anticipated Discharge Date: 10/1/20
 
Discharge Date: 
Expected LOS: 3
Initial Reviewer: [id number]
Initial Review Date: 2020
Generated: 10/5/20   3:15 pm 
Comments
 
DCP- Discharge Planning
 
Updated by [email or web address]: Geno Ewing on 10/5/20   1:05 pm CT
1404: CM has contacted Mrs. Lira, in regards to bringing the patient's CPM 
with her when the patient is discharged. She verifies that she will take it 
to the nursing facility when she signs paperwork for patient's admission. Mrs. Lira is aware that the patient will be in quarantine for the first 14 days 
upon arrival to [facility].  
  
CM contacted Shira [facility], who states she will have a male bed available 
10/6.
DCP- Discharge Planning
 
Updated by [id number]: Doreen Sales on 10/1/20  12:24 pm CT
CM noted PT notes, recommending rehab. I spoke with patient, he is confused 
and unable to choose a rehab facility. I spoke with patient's wife on the 
phone and explained inpatient rehab vs SNF. She would like a referral to [facility]. I spoke with Ana Rosa with [facility] and referral faxed. CM will 
continue to follow and assist with discharge planning/needs.
DCP- Discharge Planning
 
Updated by [id number]: Geno Ewing on 20  12:55 pm CT
 
CM met with patient and wife, Tamika Lira ([phone]) regarding dc plans. 
Patient is drowsy and request that his in interview. Patient is independent 
PTA, with ADL's, medication management. PCP: Dr. Cano. Pharmacy: [facility], 
[address] DME: walker, cane. A CPM was delivered to the home, as well PTA. 
CM discussed HHS, OP Therapy, Rehab, SNF. PT has not worked with the patient 
as yet, so CM will re-visit tomorrow in order to determine DC needs. CM left 
a copy of HH agencies and OP Therapy with the patient's wife. CM will follow 
and assist PRN with DC needs.
 DCPIA - Discharge Planning Initial Assessment
 
Updated by [email or web address]: Geno Ewing on 20   1:58 pm
*  Is the patient Alert and Oriented?
Yes
*  How many steps to enter\exit or inside your home? 3 wide w/r *  PCP Dr. Cano * 
Pharmacy
[facility]
*  Preadmission Environment
Home with Family
*  ADLs
Independent
*  Equipment
Cane
Rolling Walker
*  Other Equipment
CPM
*  List name and contact numbers for known caregivers / representatives who 
currently or will assist patient after discharge:
Tamika Lira (wife) [phone]
*  Verbal permission to speak to the caregivers and representatives has been 
obtained from the patient.
Yes
*  Community resources currently utilized
None
*  Additional services required to return to the preadmission environment?
Yes
*  Can the patient safely return to the preadmission environment?
Yes
*  Has this patient been hospitalized within the prior 30 days at any 
hospital?
No
 
 
 
 
 
Coverage Notice
 
Reviewer: [id number] - Geno Ewing
 
Notice Issued Date-Time: 2020  14:57
Notice Type: Patient Choice Letter
 
 
Notice Delivered To: Patient
Relationship to Patient: Self
Representative Name: Tamika Lira
 
Delivery Method: HAND - Hand Delivered
Jennifer Days:
Prior Verbal Notification: 
 
Recipient Understood Notice: Yes
Recipient Signature: Yes
Med Rec Note Co-signed by Attending:
 
Coverage Notice Comment:  NP OP Therapy
Reviewer: [email or web address] - Doreen Sales
 
Notice Issued Date-Time: 10/01/2020  13:25
Notice Type: Patient Choice Letter
 
Notice Delivered To: Family Member
Relationship to Patient: Spouse
Representative Name: Tamika Lira
 
Delivery Method: PHONE - Phone
Jennifer Days:
Prior Verbal Notification: 
 
Recipient Understood Notice: Yes
Recipient Signature: 
Med Rec Note Co-signed by Attending:
 
Coverage Notice Comment:  [facility]
 
Last DP export: 10/5/20   8:39 a
Patient Name: BETTY LIRA
 
Encounter #: [id number]
Page [phone]
 
 
 
 
 
Electronically Signed by VIVI RICH on 10/05/20 at 1416
 
 
 
 
 
 
**All edits/amendments must be made on the electronic document**
 
DICTATION DATE: 10/05/20 1415     : POLA  10/05/20 1415     
RPT#: [id number]                                DC DATE:        
                                               STATUS: ADM IN  
[facility]
 [place] [address]
***END OF REPORT***

## 2020-10-06 VITALS — SYSTOLIC BLOOD PRESSURE: 118 MMHG | DIASTOLIC BLOOD PRESSURE: 74 MMHG

## 2020-10-06 VITALS — DIASTOLIC BLOOD PRESSURE: 78 MMHG | SYSTOLIC BLOOD PRESSURE: 112 MMHG

## 2020-10-06 VITALS — DIASTOLIC BLOOD PRESSURE: 57 MMHG | SYSTOLIC BLOOD PRESSURE: 114 MMHG

## 2020-10-06 VITALS — DIASTOLIC BLOOD PRESSURE: 68 MMHG | SYSTOLIC BLOOD PRESSURE: 111 MMHG

## 2020-10-06 LAB
ALBUMIN SERPL-MCNC: 3 G/DL (ref 3.4–5)
ALP SERPL-CCNC: 87 U/L (ref 30–120)
ALT SERPL-CCNC: 58 U/L (ref 10–68)
ANION GAP SERPL CALC-SCNC: 9.3 MMOL/L (ref 8–16)
BASOPHILS NFR BLD AUTO: 0.6 % (ref 0–2)
BILIRUB SERPL-MCNC: 0.82 MG/DL (ref 0.2–1.3)
BUN SERPL-MCNC: 29 MG/DL (ref 7–18)
CALCIUM SERPL-MCNC: 9 MG/DL (ref 8.5–10.1)
CHLORIDE SERPL-SCNC: 96 MMOL/L (ref 98–107)
CO2 SERPL-SCNC: 33.1 MMOL/L (ref 21–32)
CREAT SERPL-MCNC: 1.4 MG/DL (ref 0.6–1.3)
EOSINOPHIL NFR BLD: 5.8 % (ref 0–7)
ERYTHROCYTE [DISTWIDTH] IN BLOOD BY AUTOMATED COUNT: 13.5 % (ref 11.5–14.5)
GLOBULIN SER-MCNC: 4.3 G/L
GLUCOSE SERPL-MCNC: 103 MG/DL (ref 74–106)
HCT VFR BLD CALC: 37.8 % (ref 42–54)
HGB BLD-MCNC: 12.4 G/DL (ref 13.5–17.5)
IMM GRANULOCYTES NFR BLD: 1.9 % (ref 0–5)
LYMPHOCYTES NFR BLD AUTO: 14.3 % (ref 15–50)
MCH RBC QN AUTO: 29 PG (ref 26–34)
MCHC RBC AUTO-ENTMCNC: 32.8 G/DL (ref 31–37)
MCV RBC: 88.5 FL (ref 80–100)
MONOCYTES NFR BLD: 10.2 % (ref 2–11)
NEUTROPHILS NFR BLD AUTO: 67.2 % (ref 40–80)
OSMOLALITY SERPL CALC.SUM OF ELEC: 275 MOSM/KG (ref 275–300)
PLATELET # BLD: 301 10X3/UL (ref 130–400)
PMV BLD AUTO: 10.2 FL (ref 7.4–10.4)
POTASSIUM SERPL-SCNC: 3.4 MMOL/L (ref 3.5–5.1)
PROT SERPL-MCNC: 7.3 G/DL (ref 6.4–8.2)
RBC # BLD AUTO: 4.27 10X6/UL (ref 4.2–6.1)
SODIUM SERPL-SCNC: 135 MMOL/L (ref 136–145)
WBC # BLD AUTO: 10.8 10X3/UL (ref 4.8–10.8)

## 2020-10-06 NOTE — MORECARE
CASE MANAGEMENT DISCHARGE SUMMARY
 
 
PATIENT: BETTY LIRA                      UNIT: [id number]
ACCOUNT#: [id number]                       ADM DATE: 20
AGE: 70     : 52  SEX: M            ROOM/BED: D.8824    
AUTHOR: ALYLA,DOC                             PHYSICIAN:                               
 
REFERRING PHYSICIAN: CHANCE TERRELL MD        
DATE OF SERVICE: 10/06/20
Discharge Plan
 
 
Patient Name: BETTY LIRA
Facility: [facility]
Encounter #: [id number]
Medical Record #: [id number]
: 1950
Planned Disposition: Skilled Nursing Facility
Anticipated Discharge Date: 10/1/20
 
Discharge Date: 
Expected LOS: 3
Initial Reviewer: [id number]
Initial Review Date: 2020
Generated: 10/6/20  12:17 pm 
Comments
 
DCP- Discharge Planning
 
Updated by [id number]: Doreen Sales on 10/6/20  10:15 am CT
CM received a call that patient can DC to the [facility] (per Ana Rosa). Angel 
(primary nurse) states should go via ambulance. Angel to call repor to [facility].  DCS and med list/MAR faxed to [facility]. Wife is in room and 
agrees with DC. IMM explained, signed by wife and given. She states she will 
take the CPM to [facility].
DCP- Discharge Planning
 
Updated by [id number]: Geno Ewing on 10/5/20   1:05 pm CT
1404: CM has contacted Mrs. Lira, in regards to bringing the patient's CPM 
with her when the patient is discharged. She verifies that she will take it 
to the nursing facility when she signs paperwork for patient's admission. Mrs. Lira is aware that the patient will be in quarantine for the first 14 days 
upon arrival to [facility].  
  
CM contacted Shira, [facility], who states she will have a male bed available 
10/6.
DCP- Discharge Planning
 
Updated by [id number]: Doreen Sales on 10/1/20  12:24 pm CT
 
CM noted PT notes, recommending rehab. I spoke with patient, he is confused 
and unable to choose a rehab facility. I spoke with patient's wife on the 
phone and explained inpatient rehab vs SNF. She would like a referral to [facility] 
[facility]. I spoke with Ana [facility] with [facility] and referral faxed. CM will 
continue to follow and assist with discharge planning/needs.
DCP- Discharge Planning
 
Updated by [id number]: Geno Ewing on 20  12:55 pm CT
CM met with patient and wife, Tamika Lira ([phone]) regarding dc plans. 
Patient is drowsy and request that his in interview. Patient is independent 
PTA, with ADL's, medication management. PCP: Dr. Cano. Pharmacy: [facility], 
[address] DME: walker, cane. A CPM was delivered to the home, as well PTA. 
CM discussed HHS, OP Therapy, Rehab, SNF. PT has not worked with the patient 
as yet, so CM will re-visit tomorrow in order to determine DC needs. CM left 
a copy of HH agencies and OP Therapy with the patient's wife. CM will follow 
and assist PRN with DC needs.
 DCPIA - Discharge Planning Initial Assessment
 
Updated by [email or web address]: Geno Ewing on 20   1:58 pm
*  Is the patient Alert and Oriented?
Yes
*  How many steps to enter\exit or inside your home? 3 wide w/r *  PCP Dr. Cano * 
Pharmacy
[facility], [address]
*  Preadmission Environment
Home with Family
*  ADLs
Independent
*  Equipment
Cane
Rolling Walker
*  Other Equipment
CPM
*  List name and contact numbers for known caregivers / representatives who 
currently or will assist patient after discharge:
Tamika Lira (wife) [phone]
*  Verbal permission to speak to the caregivers and representatives has been 
obtained from the patient.
Yes
*  Community resources currently utilized
None
*  Additional services required to return to the preadmission environment?
Yes
*  Can the patient safely return to the preadmission environment?
Yes
*  Has this patient been hospitalized within the prior 30 days at any 
hospital?
No
 
 
 
 
 
 
Coverage Notice
 
Reviewer: [id number] - Geno Ewing
 
Notice Issued Date-Time: 2020  14:57
Notice Type: Patient Choice Letter
 
Notice Delivered To: Patient
Relationship to Patient: Self
Representative Name: Tamika Lira
 
Delivery Method: HAND - Hand Delivered
Jennifer Days:
Prior Verbal Notification: 
 
Recipient Understood Notice: Yes
Recipient Signature: Yes
Med Rec Note Co-signed by Attending:
 
Coverage Notice Comment:  NP OP Therapy
Reviewer: [email or web address] - Doreen Sales
 
Notice Issued Date-Time: 10/01/2020  13:25
Notice Type: Patient Choice Letter
 
Notice Delivered To: Family Member
Relationship to Patient: Spouse
Representative Name: Tamika Lira
 
Delivery Method: PHONE - Phone
Jennifer Days:
Prior Verbal Notification: 
 
Recipient Understood Notice: Yes
Recipient Signature: 
Med Rec Note Co-signed by Attending:
 
Coverage Notice Comment:  [facility]
Reviewer: [email or web address] Mary Sales
 
Notice Issued Date-Time: 10/06/2020  11:12
Notice Type: IM Discharge Notice
 
Notice Delivered To: Family Member
Relationship to Patient: Spouse
Representative Name: Luh Lira
 
Delivery Method: HAND - Hand Delivered
Jennifer Days:
Prior Verbal Notification: 
 
 
Recipient Understood Notice: Yes
Recipient Signature: Yes
Med Rec Note Co-signed by Attending:
 
Coverage Notice Comment:  IMM explained, signed by wife, given, copy placed in
MR
 
Last DP export: 10/5/20   1:16 p
Patient Name: BETTY LIRA
Encounter #: [id number]
Page [phone]
 
 
 
 
 
Electronically Signed by VIVI RICH on 10/06/20 at 1118
 
 
 
 
 
 
**All edits/amendments must be made on the electronic document**
 
DICTATION DATE: 10/06/20 1117     : POLA  10/06/20 1117     
RPT#: [id number]                                DC DATE:        
                                               STATUS: ADM IN  
[facility]
[address] [place] [address]
***END OF REPORT***

## 2020-10-06 NOTE — NUR
PT DISCHARGED TO [facility] VIA [facility]. PT SIGNED PROPER DISCHARGE
INSTRUCTIONS AND REMOVED ALL VALUABLES FROM THE ROOM.

## 2020-10-07 NOTE — MORECARE
CASE MANAGEMENT DISCHARGE SUMMARY
 
 
PATIENT: BETTY LIRA                      UNIT: [id number]
ACCOUNT#: [id number]                       ADM DATE: 20
AGE: 70     : 52  SEX: M            ROOM/BED: D.4894    
AUTHOR: LAYLA,DOC                             PHYSICIAN:                               
 
REFERRING PHYSICIAN: CHANCE TERRELL MD        
DATE OF SERVICE: 10/07/20
Discharge Plan
 
 
Patient Name: BETTY LIRA
Facility: [facility]:[place]
Encounter #: [id number]
Medical Record #: [id number]
: 1950
Planned Disposition: Skilled Nursing Facility
Anticipated Discharge Date: 10/1/20
 
Discharge Date: 10/06/2020
Expected LOS: 3
Initial Reviewer: [id number]
Initial Review Date: 2020
Generated: 10/7/20  10:14 am 
Comments
 
DCP- Discharge Planning
 
Updated by [id number]: Doreen Sales on 10/6/20  10:15 am CT
CM received a call that patient can DC to the [facility] (per Ana Rosa). Angel 
(primary nurse) states should go via ambulance. Angel to call repor to [facility].  DCS and med list/MAR faxed to [facility]. Wife is in room and 
agrees with DC. IMM explained, signed by wife and given. She states she will 
take the CPM to [facility].
DCP- Discharge Planning
 
Updated by [email or web address]: Geno Ewing on 10/5/20   1:05 pm CT
1404: CM has contacted Mrs. Lira, in regards to bringing the patient's CPM 
with her when the patient is discharged. She verifies that she will take it 
to the nursing facility when she signs paperwork for patient's admission. Mrs. Lira is aware that the patient will be in quarantine for the first 14 days 
upon arrival to [facility].  
  
CM contacted Shira, [facility], who states she will have a male bed available 
10/6.
DCP- Discharge Planning
 
Updated by [id number]: Doreen Sales on 10/1/20  12:24 pm CT
 
CM noted PT notes, recommending rehab. I spoke with patient, he is confused 
and unable to choose a rehab facility. I spoke with patient's wife on the 
phone and explained inpatient rehab vs SNF. She would like a referral to [facility] 
[facility]. I spoke with Ana Rosa with [facility] and referral faxed. CM will 
continue to follow and assist with discharge planning/needs.
DCP- Discharge Planning
 
Updated by [id number]: Geno Ewing on 20  12:55 pm CT
CM met with patient and wife, Tamika Lira ([phone]) regarding dc plans. 
Patient is drowsy and request that his in interview. Patient is independent 
PTA, with ADL's, medication management. PCP: Dr. Cano. Pharmacy: [facility] DME: walker, cane. A CPM was delivered to the home, as well PTA. 
CM discussed HHS, OP Therapy, Rehab, SNF. PT has not worked with the patient 
as yet, so CM will re-visit tomorrow in order to determine DC needs. CM left 
a copy of HH agencies and OP Therapy with the patient's wife. CM will follow 
and assist PRN with DC needs.
 DCPIA - Discharge Planning Initial Assessment
 
Updated by [id number]: Geno Ewing on 20   1:58 pm
*  Is the patient Alert and Oriented?
Yes
*  How many steps to enter\exit or inside your home? 3 wide w/r *  PCP Dr. Cano * 
Pharmacy
[facility]
*  Preadmission Environment
Home with Family
*  ADLs
Independent
*  Equipment
Cane
Rolling Walker
*  Other Equipment
CPM
*  List name and contact numbers for known caregivers / representatives who 
currently or will assist patient after discharge:
Tamika Lira (wife) [phone]
*  Verbal permission to speak to the caregivers and representatives has been 
obtained from the patient.
Yes
*  Community resources currently utilized
None
*  Additional services required to return to the preadmission environment?
Yes
*  Can the patient safely return to the preadmission environment?
Yes
*  Has this patient been hospitalized within the prior 30 days at any 
hospital?
No
 
 
 
 
 
 
Coverage Notice
 
Reviewer: [id number] - Geno Ewing
 
Notice Issued Date-Time: 2020  14:57
Notice Type: Patient Choice Letter
 
Notice Delivered To: Patient
Relationship to Patient: Self
Representative Name: Tamika Lira
 
Delivery Method: HAND - Hand Delivered
Jennifer Days:
Prior Verbal Notification: 
 
Recipient Understood Notice: Yes
Recipient Signature: Yes
Med Rec Note Co-signed by Attending:
 
Coverage Notice Comment:  NP OP Therapy
Reviewer: [id number] - Doreen Sales
 
Notice Issued Date-Time: 10/01/2020  13:25
Notice Type: Patient Choice Letter
 
Notice Delivered To: Family Member
Relationship to Patient: Spouse
Representative Name: Tamika Lira
 
Delivery Method: PHONE - Phone
Jennifer Days:
Prior Verbal Notification: 
 
Recipient Understood Notice: Yes
Recipient Signature: 
Med Rec Note Co-signed by Attending:
 
Coverage Notice Comment:  [facility]
Reviewer: [email or web address] Mary Sales
 
Notice Issued Date-Time: 10/06/2020  11:12
Notice Type: IM Discharge Notice
 
Notice Delivered To: Family Member
Relationship to Patient: Spouse
Representative Name: Luh Lira
 
Delivery Method: HAND - Hand Delivered
Jennifer Days:
Prior Verbal Notification: 
 
 
Recipient Understood Notice: Yes
Recipient Signature: Yes
Med Rec Note Co-signed by Attending:
 
Coverage Notice Comment:  IMM explained, signed by wife, given, copy placed in
MR
 
Last DP export: 10/6/20  10:18 a
Patient Name: BETTY LIRA
Encounter #: [id number]
Page [phone]
 
 
 
 
 
Electronically Signed by VIVI RICH on 10/07/20 at 0915
 
 
 
 
 
 
**All edits/amendments must be made on the electronic document**
 
DICTATION DATE: 10/07/20 0914     : POLA  10/07/20 0914     
RPT#: [id number]                                DC DATE:10/06/20
                                               STATUS: DIS IN  
[facility]
[address] [facility] [place] [address]
***END OF REPORT***

## 2022-01-18 NOTE — HEMODYNAMI
PATIENT:BETTY CHILDRESS                                   MEDICAL RECORD: [id number]
: 52                                            LOCATION[facility]     D.2114
[id number]                                       ADMISSION DATE: 20
 
 
 Generatedon:10/1/242590:32
Patient name: BETTY CHILDRESS Patient #: [id number] Visit #: [id number] SSN: [id number]
[id number] : 1950 Date
of study: 10/1/2020
Page: Of
Hemodynamic Procedure Report
****************************
Patient Data
Patient Demographics
Procedure consent was obtained
First Name: BETTY          Gender: Male
Last Name: FIOR            : 1950
Middle Initial: JIM           Age: 70 year(s)
Patient #: [id number]       Race: 
Visit #: [id number]
SSN: [id number]
Accession #:
[id number]
Additional ID: [id number]
Contact details
Address: [address] [place] [address]      Phone: [phone]
[facility]
State: [place]
City: [place]
Zip code: [address]
Past Medical History
Allergies: No known allergies
Admission
Admission Data
Admission Date: 2020   Admission Time: 5:50
Arrival Date: 10/1/2020     Arrival Time: 0:00
Admit Source: Other         Insurance Payor: Medicare
Room #: D.2114              [facility] #: [id number]
Height (in.): 70.87         BSA: 2.41 (m2)
Height (cm.): 180           BMI: 38.58 (kg/m2)
Weight (lbs.): 275.58
Weight (kg.): 125
Lab Results
Lab Result Date: 10/1/2020  Lab Result Time: 0:00
Biochemistry
Name         Units    Result                Min      Max
BUN          mg/dl    13       --(--*-)--   7        18
Creatinine   mg/dl    1        --(--*-)--   0.6      1.3
eGFR         ml/min   79.38344 *-(----)--   90       120
NONAFRICAN
CBC
Name         Units    Result                Min      Max
Hemoglobin   g/dl     11.6     *-(----)--   13.5     17.5
Procedure
Procedure Types
Cath Procedure
Diagnostic Procedure
LHC
[facility] w/Coronaries
 
Sedation Charges
Moderate Sedation up to 15 minutes
PCI Procedure
Coronary Stent
Coronary Stent Initial
Hemochron ACT Test
Procedure Description
Procedure Date
Procedure Date: 10/1/2020
Procedure Start Time: 11:04
Procedure End Time: 11:23
Procedure Staff
Name                            Function
Michele Boateng MD              Performing Physician
Marta Grady RT               Monitor
Parish Trent RN                  Nurse
Catherine Katz RT              Scrub
Indication
Chest discomfort
Procedure Data
Cath Procedure
Fluoroscopy
Diagnostic fluoroscopy      Total fluoroscopy Time: 4.7
time: 4.7 min               min
Diagnostic fluoroscopy      Total fluoroscopy dose:
dose: 1411 mGy              1411 mGy
Contrast Material
Contrast Material Type                       Amount (ml)
Isovue 300                                   83
Entry Location
Entry     Primary  Successful  Side  Size  Upsize Upsize Entry    Closure     Elder
ccessful  Closure
Location                             (Fr)  1 (Fr) 2 (Fr) Remarks  Device        
          Remarks
Radial                         Right 6 Fr                         Mechanical
artery                               Short                        Compression
Estimated blood loss: 5 ml
Diagnostic catheters
Device Type               Used For           End Catheter
Placement
DIAGNOSTIC [place] 110cm 5  Procedure
Fr catheter (222115)
DIAGNOSTIC [place] MOD 5Fr     Procedure
Catheter (252252W)
Procedure Complications
No complications
Procedure Medications
Medication           Administration Route Dosage
Oxygen               etCO2 Nasal cannula  2 l/min
Lidocaine 2%         added to field       20
Heparin Flush Bag    added to field       2 bags
(1000units/500ml NS)
0.9% NaCl            I.V.                 100 ml/hr
Radial Cocktail      I.A.                 1 syringe
(Verapamil 2mg/Nitro
400mcg/Heparin
1500units)
Versed               I.V.                 1 mg
Fentanyl             I.V.                 50 mcg
Heparin Bolus        I.V.                 5000 units
 
Integrilin (Bolus    I.V.                 11.3 ml
2mg/ml)
Benadryl             I.V.                 50 mg
Dobutamine           I.V. drip            5 mcg/kg/min
(500mg/250ml D5W)
Plavix               P.O.                 600 mg
Hemodynamics
Rest
BSA: 2.41 (m2) HGB: 11.6 (g/dl) O2 Consumption: Estimated: 299.87 (ml/min) O2 Co
nsumption indexed:
Estimated:124.43 (ml/min/m) Heart Rate: 93 (bpm)
Pressure Samples
Time     Site     Value (mmHg) Purpose      Heart      Use
Rate(bpm)
11:05    LV       88/0,-1      Snapshot     95
Gradients
Valve  Time  Site Site Mean    SEP/DFP    Peak To Heart  Use
1    2    (mmHg)  (sec/min)  Peak    Rate
(mmHg)  (bpm)
Aortic 11:06 LV   AO                              95
Snapshots
Pre Cath      Intra         NCS           Post Cath
Vital Signs
Time     Heart  Resp   SPO2 etCO2   NIBP (mmHg)  Rhythm  Pain    Sedation
Rate   (ipm)  (%)  (mmHg)                       Status  Level
(bpm)
10:52:25 92     15     95   12      152/100(128) NSR     0 (11)  10(A)
, No
pain
10:57:01 92     17     95   8.3     162/85(134)  NSR     0 (11)  10(A)
, No
pain
11:01:23 90     18     94   0       141/91(114)  NSR     0 (11)  10(A)
, No
pain
11:05:47 96     13     93   10.5    117/74(89)   NSR     0 (11)  9(A)
, No
pain
11:09:57 93     17     93   7.5     117/74(97)   NSR     0 (11)  9(A)
, No
pain
11:14:09 88     14     91   10.5    133/83(99)   NSR     0 (11)  9(A)
, No
pain
11:18:21 88     19     93   0       135/82(106)  NSR     0 (11)  9(A)
, No
pain
11:23:20 88     18     95   21.1    132/92(115)  NSR     0 (11)  10(A)
, No
pain
Medications
Time     Medication       Route   Dose       Verified Delivered Reason          
Notes    Effectiveness
by       by
10:55:40 Benadryl         I.V.    50 mg      Michele Lugo    used for
Kilo  Trent RN   procedure
MD
10:59:07 Oxygen           etCO2   2 l/min    Michele Lugo    used for
Nasal              [facility] Trent RN   procedure
cannula            MD
 
10:59:14 Lidocaine 2%     added   20ml vial  Michele Bautista   for local
to                 [facility]   anesthetic
field MD WELCH
10:59:19 Heparin Flush    added   2 bags     Michele Bautista   used for
Bag              to                 [facility]   procedure
(1000units/500ml field MD WELCH
NS)
10:59:27 0.9% NaCl        I.V.    100 ml/hr  Michele Lugo    Per physician
[facility] Trent RN, MD
10:59:45 Versed           I.V.    1 mg       Michele Lugo    for sedation
[facility] Trent RN, MD
10:59:50 Fentanyl         I.V.    50 mcg     Michele Lugo    for sedation
[facility] Trent RN, MD
11:03:51 Radial Cocktail  I.A.    1 syringe  Michele Bautista   for
(Verapamil                          Kilo  [place]   vasodilation
2mg/Nitro                           MD WELCH
400mcg/Heparin
1500units)
11:13:55 Heparin Bolus    I.V.    5000 units Michele Lugo    for             
verified
[facility] Trent RN   anticoagulation with dr MD fu
11:17:21 Integrilin       I.V.    11.3 ml    Michele Lugo    for             
wasted
(Bolus 2mg/ml)                      [facility] Trent RN   antiplatelet    8.7 ml
MD                 therapy         of vial
11:24:04 Dobutamine       I.V.    5          Michele Lugo    Per physician
(500mg/250ml     drip    mcg/kg/min [facility] Trent RN
D5W)                                MD
11:24:25 Plavix           P.O.    600 mg     Michele Lugo    for
Kilo  Bell RN   antiplatelet
MD                 therapy
Procedure Log
Time     Note
10:13:12 Arrival Date: 10/1/2020 12:00:00 AM
10:13:14 Admit Source: Other
10:13:17 Insurance Payor : Medicare
10:13:34 Patient Height : 70.87 inches
10:13:37 Patient Weight : 275.58 lbs
10:14:33 Lab Result : BUN 13 mg/dl
10:14:33 Lab Result : eGFR NONAFRICAN 79.69560 ml/min
10:14:33 Lab Result : Hemoglobin 11.6 g/dl
10:14:33 Lab Result : Creatinine 1 mg/dl
10:15:10 Indication : Chest discomfort
10:15:37 Procedure Status Urgent Heart Cath (IP).
10:16:26 Catherine Katz RT(R) (CV) sent for patient. Start
room use.
10:16:28 Time tracking: Regular hours (M-F 7:00 - 5:00)
10:16:34 Plan of Care:Hemodynamics will remain stable., Cardiac
rhythm will remain stable., Comfort level will be
maintained., Respiratory function will remain
adequate., Patient/ family verbilizes understanding of
procedure., Procedure tolerated without complication.,
Recovers from procedure without complications..
10:16:40 Patient received from Med II to CCL 2 Alert and
oriented. Tansferred to table in Supine position.
10:16:43 Signed procedure consent form obtained from patient.
 
10:16:55 H&P Date Dictated: 2020 Within 30 days and on
chart., H&P Addendum completed by physician on day of
procedure. (MUST COMPLETE FOR ALL OUTPATIENTS).
10:31:48 Patient allergic to No known allergies
10:51:16 Vital chart was started
10:54:51 Baseline sample Acquired.
10:54:54 Full Disclosure recording started
10:54:58 Family in patients room.
10:55:00 Patient NPO since Midnight.
10:55:05 Is the patient allergic to Iodine/contrast media? No.
10:55:07 Was the patient premedicated? Yes
10:55:08 Is patient on blood thinner?Yes
10:55:13 **ACC** The patient was administered the following
blood thiners within the last 24 hours: Eliquis
10:55:16 Patient diabetic? No.
10:55:20 Snore? Yes
10:55:22 Sleep apnea? No
10:55:26 Airway obstruction? Unknown ?
10:55:30 Patient pain scale 0/10 ?.
10:55:39 IV patent on arrival in left forearm with 0.9% NaCl at
KVO.
10:55:40 Benadryl 50 mg I.V. was administered by Parish Trent RN; used for procedure; Verbal order read back and
verified.
10:55:43 Lab results completed and on chart.
10:55:51 Right Radial & Left Groin area was prepped with
chlora-prep and draped in sterile fashion
10:55:52 Alarms reviewed by R. N.
10:55:53 Sharps counted by scrub and verified by R.N.
10:55:55 Physician arrived
10:55:56 --------ALL STOP TIME OUT------
10:55:57 Final Timeout: patient, procedure, and site verified
with staff and physician. All members of the team are
in agreement.
10:56:01 Right Radial & Left Groin site verified by team.
10:56:05 Fire Safety Assessment: A--An alcohol-based skin
anteseptic being used preoperatively., C--Open oxygen
or nitrous oxide is being used., D--An ESU, laser, or
fiber-optic light is being used.
10:56:11 Physical assessment completed. ASA score P 2 - A
patient with mild systemic disease as per Michele Boateng MD.
10:56:15 2) 60-89 Mildly reduced kidney function, and other
findings (as for stage 1) point to kidney disease.
10:56:18 Maximum allowable contrast dose (3.7 X eGFR X 0.75)220
ml.
10:56:23 Sedation plan: IV Moderate Sedation Medication:Versed,
Fentanyl
10:56:28 Use device set Radial Dx or PCI
10:59:07 Oxygen 2 l/min etCO2 Nasal cannula was administered by
Parish Trent RN; used for procedure; Verbal order read
back and verified.
10:59:14 Lidocaine 2% 20ml vial added to field was administered
by Michele Boateng MD; for local anesthetic; Verbal
order read back and verified.
10:59:18 ACIST Syringe (78062) opened to sterile field.
10:59:19 Heparin Flush Bag (1000units/500ml NS) 2 bags added to
field was administered by Michele Boateng MD; used for
procedure; Verbal order read back and verified.
10:59:19 Medline Cath Pack ([id number]) opened to sterile field.
 
10:59:20 Bag Decanter (2002S) opened to sterile field.
10:59:21 ACIST Hand Control (12583) opened to sterile field.
10:59:21 ACIST Manifold (47070) opened to sterile field.
10:59:27 0.9% NaCl 100 ml/hr I.V. was administered by Parish Trent RN; Per physician; Verbal order read back and
verified.
10:59:27 MBrace Wrist Support ([id number]) opened to sterile
field.
10:59:29 EMERALD Guide Wire (040-825) opened to sterile field.
10:59:30 SHEATH 6FR RAIN (1659162) opened to sterile field.
10:59:45 Versed 1 mg I.V. was administered by Parish Trent RN;
for sedation; Verbal order read back and verified.
10:59:50 Fentanyl 50 mcg I.V. was administered by Parish Trent
RN; for sedation; Verbal order read back and verified.
11:03:51 Radial Cocktail (Verapamil 2mg/Nitro 400mcg/Heparin
1500units) 1 syringe I.A. was administered by Michele Boateng MD; for vasodilation; Verbal order read back
and verified.
11:04:10 Procedure started.
11:04:21 Local anesthetic to right radial artery with Lidocaine
2% by Michele Boateng MD.**INITIAL ACCESS ONLY**
11:04:32 A 6 Fr Short sheath was inserted into the Right Radial
artery
11:04:37 J wire advanced.
11:04:46 A DIAGNOSTIC [place] 110cm 5 Fr catheter (595108) was
advanced over the wire and used for Procedure.
11:04:58 LV angiography performed.
11:06:15 EF : 20 %
11:06:40 LCA angiography performed.
11:08:47 Catheter removed.
11:09:02 A DIAGNOSTIC [place] MOD 5Fr Catheter ([id number]) was
advanced over the wire and used for Procedure.
11:09:08 RCA angiography performed.
11:11:20 Catheter removed.
11:13:18 Proceeding to intervention.
11:13:33 Pre PCI Site: Native mRCA has 80% stenosis.
11:13:44 6 Fr hs1 guide catheter was inserted over the wire
11:13:55 Heparin Bolus 5000 units I.V. was administered by
Parish Trent RN; for anticoagulation; verified with dr fu Verbal order read back and verified.
11:14:27 [place] 300cm Universal 2 J wire ([id number]) opened to
sterile field.
11:14:28 INFLATOR [place] BasixCompak (OI1648) opened to sterile
field.
11:14:29 GUIDE 6FR HS I catheter (LA6HSI) opened to sterile
field.
11:14:52 BMW wire advanced.
11:17:21 Integrilin (Bolus 2mg/ml) 11.3 ml I.V. was
administered by Parish Trent RN; for antiplatelet
therapy; wasted 8.7 ml of vial Verbal order read back
and verified.
11:19:25 Place stent Inflation Number: 1 A INTEGRITY 4.0 x 18
stent ([id number]) was prepped and advanced across the
Mid RCA 80. The stent was deployed at 14 SERVANDO for 0:23
(min:sec) 0.
11:19:55 ZEPHYR LARGE TR BAND (860012) opened to sterile field.
11:20:04 Stent catheter was removed intact over wire.
11:20:29 Sheath removed intact; hemostasis achieved with
Mechanical Compression to the Right Radial artery.
11:20:32 Procedure ended.(Physican Out)
 
11:21:10 Fluoroscopy time 04.70 minutes.
11:21:16 Fluoroscopy dose: 1411 mGy
11:21:16 Flurop Dose total: 1411
11:21:22 Dose Area Product 97779 mGy/cm.
11:21:33 Contrast amount:Isovue 300 83ml.
11:21:35 Maximum allowable dose exceeded? No.
11::39 [place] band inflated with 10cc of air.
11::40 Insertion/operative site no bleeding no hematoma.
11:21:41 Post Procedure Pulses reassessed and unchanged
11:21:45 Post-procedure physical assessment completed. ASA
score P 3 - A patient with severe systemic disease as
per Michele Boateng MD.
11:21:48 Post procedure rhythm: unchanged.
11:21:51 Estimated blood loss: 5 ml
11:21:53 Post procedure instruction explained to
patient.Patient verbalizes understanding.
11:21:55 Patient needs reinforcement of post procedure
teaching.
11:22:37 Procedure type changed to Cath procedure, Diagnostic
procedure, LHC, [facility] w/Coronaries, Sedation Charges,
Moderate Sedation up to 15 minutes, PCI procedure,
Coronary Stent, Coronary Stent Initial, Hemochron ACT
Test
11::38 Procedure and supply charges have been captured,
reviewed, submitted and are correct.
11:23:14 Procedure Complication : No complications
11:23:16 Vital chart was stopped
11::21 [facility] Findings: MVD- PCI performed (see procedure note)
11:23:29 Report given to [facility].
11:23:33 Patient transfered to [facility] with Bed.
11::35 Procedure ended.
11::35 Full Disclosure recording stopped
11:23:48 End room use (Document Last)
11:24:04 Dobutamine (500mg/250ml D5W) 5 mcg/kg/min I.V. drip
was administered by Parish Trent RN; Per physician;
Verbal order read back and verified.
11:24:07 End room use (Document Last)
11:24:25 Plavix 600 mg P.O. was administered by Parish Trent RN;
for antiplatelet therapy; Verbal order read back and
verified.
11:24:40 End room use (Document Last)
11:27:41 ACT drawn and resulted at out of range high seconds.
(normal therapeutic range 180-240 seconds).
Intervention Summary
Intervention Notes
Time     ActionType  Lesion and  Equipment    Action#  Pressure  Duration
Attributes  Used
11:19:25 Place stent Mid RCA     INTEGRITY    1        14        00:23
4.0 x 18
stent
([id number])
Device Usage
Item Name    Manufacture  Quantity  Catalog      Hospital Part     Current Minim
al Lot# /
Number       Charge   Number   Stock   Stock   Serial#
Code
ACIST        Acist        1         74274        741656   978015   184441  20
Syringe      Medical
(32598)      Systems Inc
Medline Cath Medline      1         [id number]    809028   66993    456414  5
 
Pack
([id number])
Bag Decanter Microtek     1         S        474767   01892    354626  5
(S)      Medical Inc.
ACIST Hand   Acist        1         47501        052218   016339   045098  5
Control      Medical
(94551)      Systems Inc
ACIST        Acist        1         07974        224272   019241   794949  5
Manifold     Medical
(42424)      Systems Inc
MBrace Wrist Advanced     1         140-0250-00  495406   00119    329524  5
Support      Vascular
(605888961)  Dynamics
EMERALD      Cardinal     1         502-455      114669   895994   466797  5
Guide Wire   [facility]
(502455)
SHEATH 6FR   Cardinal     1         7414912      727713   5873827  578641  5
[facility]
(1862381)
DIAGNOSTIC   Terumo       1         405013      968205   372426   095940  5
[place]iger 110cm
5 Fr
catheter
(473771)
DIAGNOSTIC   Cardinal     1         226170B      531642   608504   979356  15
AR MOD 5Fr   Health
Catheter
(078505J)
BMW 300cm    Abbott       1         3442893E     289228   200598   049230  5
[place] 2  Vascular
J wire
(6800706A)
INFLATOR     Merit        1         PO2609       661497   750846   482211  15
Merit        Medical
BasixCompak
(XW7400)
GUIDE 6FR HS Medtronic    1         LA6HSI       435143   68533    308969  1
I catheter
(LA6HSI)
INTEGRITY    Medtronic    1         [id number]   042028   863495   307953  5    
   [id number]
4.0 x 18
stent
([id number])
ZEPHYR LARGE Cardinal     1         620873       711707   0372999  415542  5
TR Heap
(844001)
Signature Audit [place]
Stage           Time        Signature      Unsigned
Intra-Procedure 10/1/2020   Marta Grady
11:24:07 AM RT(R)
Intra-Procedure 10/1/2020   Parish Trent RN
11:24:40 AM
Intra-Procedure 10/1/2020   Michele Felipe
11:32:02 AM Philip WELCH
Signatures
Performing Physician :  Signature :
Michele Boateng MD      ______________________________
Date : ______________ Time :
______________
 
Monitor : Marta Miguel A Signature :
RT                       ______________________________
Date : ______________ Time :
______________
Nurse : Buffie Trent RN   Signature :
______________________________
Date : ______________ Time :
______________
 
 
 
 
 
 
 
 
 
 
 
 
 
 
 
 
 
 
 
 
 
 
 
 
 
 
 
 
 
 
 
 
 
 
 
 
 
 
 
 
 
 
 
 
 
 
 
[address]                           
[place] [address]
4 = No assist / stand by assistance